# Patient Record
Sex: FEMALE | Race: AMERICAN INDIAN OR ALASKA NATIVE
[De-identification: names, ages, dates, MRNs, and addresses within clinical notes are randomized per-mention and may not be internally consistent; named-entity substitution may affect disease eponyms.]

---

## 2017-05-15 ENCOUNTER — HOSPITAL ENCOUNTER (EMERGENCY)
Dept: HOSPITAL 31 - C.ER | Age: 77
Discharge: HOME | End: 2017-05-15
Payer: MEDICARE

## 2017-05-15 VITALS
OXYGEN SATURATION: 98 % | RESPIRATION RATE: 16 BRPM | DIASTOLIC BLOOD PRESSURE: 85 MMHG | SYSTOLIC BLOOD PRESSURE: 149 MMHG

## 2017-05-15 VITALS — BODY MASS INDEX: 25 KG/M2

## 2017-05-15 VITALS — HEART RATE: 75 BPM | TEMPERATURE: 97.6 F

## 2017-05-15 DIAGNOSIS — R42: Primary | ICD-10-CM

## 2017-05-15 DIAGNOSIS — E11.22: ICD-10-CM

## 2017-05-15 DIAGNOSIS — N18.6: ICD-10-CM

## 2017-05-15 DIAGNOSIS — I12.0: ICD-10-CM

## 2017-05-15 DIAGNOSIS — Z99.2: ICD-10-CM

## 2017-05-15 LAB
ALBUMIN/GLOB SERPL: 1.2 {RATIO} (ref 1–2.1)
ALP SERPL-CCNC: 134 U/L (ref 38–126)
ALT SERPL-CCNC: 14 U/L (ref 9–52)
AST SERPL-CCNC: 33 U/L (ref 14–36)
BASOPHILS # BLD AUTO: 0.1 K/UL (ref 0–0.2)
BASOPHILS NFR BLD: 1.7 % (ref 0–2)
BILIRUB SERPL-MCNC: 1 MG/DL (ref 0.2–1.3)
BUN SERPL-MCNC: 23 MG/DL (ref 7–17)
CALCIUM SERPL-MCNC: 8.7 MG/DL (ref 8.6–10.4)
CHLORIDE SERPL-SCNC: 85 MMOL/L (ref 98–107)
CO2 SERPL-SCNC: 35 MMOL/L (ref 22–30)
EOSINOPHIL # BLD AUTO: 0.3 K/UL (ref 0–0.7)
EOSINOPHIL NFR BLD: 5.9 % (ref 0–4)
ERYTHROCYTE [DISTWIDTH] IN BLOOD BY AUTOMATED COUNT: 19.8 % (ref 11.5–14.5)
GLOBULIN SER-MCNC: 3.8 GM/DL (ref 2.2–3.9)
GLUCOSE SERPL-MCNC: 101 MG/DL (ref 65–105)
HCT VFR BLD CALC: 36.3 % (ref 34–47)
LYMPHOCYTES # BLD AUTO: 1.6 K/UL (ref 1–4.3)
LYMPHOCYTES NFR BLD AUTO: 26.7 % (ref 20–40)
MCH RBC QN AUTO: 25.1 PG (ref 27–31)
MCHC RBC AUTO-ENTMCNC: 31.9 G/DL (ref 33–37)
MCV RBC AUTO: 78.7 FL (ref 81–99)
MONOCYTES # BLD: 0.7 K/UL (ref 0–0.8)
MONOCYTES NFR BLD: 12.5 % (ref 0–10)
NRBC BLD AUTO-RTO: 0.2 % (ref 0–2)
PLATELET # BLD: 234 K/UL (ref 130–400)
PMV BLD AUTO: 8.9 FL (ref 7.2–11.7)
POTASSIUM SERPL-SCNC: 3.9 MMOL/L (ref 3.6–5.2)
PROT SERPL-MCNC: 8.3 G/DL (ref 6.3–8.3)
SODIUM SERPL-SCNC: 135 MMOL/L (ref 132–148)
TROPONIN I SERPL-MCNC: 0.06 NG/ML (ref 0–0.12)
WBC # BLD AUTO: 5.8 K/UL (ref 4.8–10.8)

## 2017-05-15 NOTE — RAD
PROCEDURE:  CHEST RADIOGRAPH, 1 VIEW



HISTORY:

SOB



COMPARISON:

10/03/2016



FINDINGS:



LUNGS:

Poor inspiration with low lung volumes, mild crowded bronchovascular 

markings and mild bibasilar atelectasis. . Previously noted mild 

venous congestive changes improved however there may be some mild 

chronic compensated pulmonary venous congestion.



PLEURA:

No pneumothorax or pleural fluid seen.



CARDIOVASCULAR:

Mild cardiomegaly.



OSSEOUS STRUCTURES:

Degenerative changes both shoulder girdles. Key



VISUALIZED UPPER ABDOMEN:

Normal.



OTHER FINDINGS:

Re- demonstrated is in situ endovascular stent within the medial 

aspect soft tissues right upper extremity with adjacent 

spur-surrounding multiple metallic vascular clips 



IMPRESSION:

Poor inspiration with low lung volumes, mild crowded bronchovascular 

markings and mild bibasilar atelectasis. . Previously noted mild 

venous congestive changes improved however there may be some mild 

chronic compensated pulmonary venous congestion.



Cardiomegaly.

## 2017-05-16 NOTE — CARD
--------------- APPROVED REPORT --------------





EKG Measurement

Heart [id number]

[place] [id number]

[id number]

[id number]

[id number]



<Conclusion>

Sinus rhythm with occasional premature ventricular complexes

Cannot rule out Anterior infarct, age undetermined

Prolonged QT

Abnormal ECG

## 2017-06-14 ENCOUNTER — HOSPITAL ENCOUNTER (OUTPATIENT)
Dept: HOSPITAL 31 - C.ER | Age: 77
Setting detail: OBSERVATION
LOS: 2 days | Discharge: HOME | End: 2017-06-16
Attending: FAMILY MEDICINE | Admitting: FAMILY MEDICINE
Payer: MEDICARE

## 2017-06-14 VITALS — BODY MASS INDEX: 25 KG/M2

## 2017-06-14 DIAGNOSIS — N18.6: ICD-10-CM

## 2017-06-14 DIAGNOSIS — I25.10: ICD-10-CM

## 2017-06-14 DIAGNOSIS — E11.22: Primary | ICD-10-CM

## 2017-06-14 DIAGNOSIS — I13.11: ICD-10-CM

## 2017-06-14 DIAGNOSIS — Z79.4: ICD-10-CM

## 2017-06-14 LAB
ALBUMIN/GLOB SERPL: 1.3 {RATIO} (ref 1–2.1)
ALP SERPL-CCNC: 130 U/L (ref 38–126)
ALT SERPL-CCNC: 21 U/L (ref 9–52)
APTT BLD: 25 SECONDS (ref 21–34)
AST SERPL-CCNC: 33 U/L (ref 14–36)
BASOPHILS # BLD AUTO: 0.1 K/UL (ref 0–0.2)
BASOPHILS NFR BLD: 1.6 % (ref 0–2)
BILIRUB SERPL-MCNC: 1 MG/DL (ref 0.2–1.3)
BUN SERPL-MCNC: 21 MG/DL (ref 7–17)
CALCIUM SERPL-MCNC: 8.1 MG/DL (ref 8.6–10.4)
CHLORIDE SERPL-SCNC: 88 MMOL/L (ref 98–107)
CO2 SERPL-SCNC: 29 MMOL/L (ref 22–30)
EOSINOPHIL # BLD AUTO: 0.2 K/UL (ref 0–0.7)
EOSINOPHIL NFR BLD: 5.8 % (ref 0–4)
ERYTHROCYTE [DISTWIDTH] IN BLOOD BY AUTOMATED COUNT: 18.3 % (ref 11.5–14.5)
GLOBULIN SER-MCNC: 3.4 GM/DL (ref 2.2–3.9)
GLUCOSE SERPL-MCNC: 92 MG/DL (ref 65–105)
HCT VFR BLD CALC: 37.9 % (ref 34–47)
INR PPP: 1
LYMPHOCYTES # BLD AUTO: 0.9 K/UL (ref 1–4.3)
LYMPHOCYTES NFR BLD AUTO: 21.8 % (ref 20–40)
MCH RBC QN AUTO: 25.9 PG (ref 27–31)
MCHC RBC AUTO-ENTMCNC: 31.5 G/DL (ref 33–37)
MCV RBC AUTO: 82.1 FL (ref 81–99)
MONOCYTES # BLD: 0.4 K/UL (ref 0–0.8)
MONOCYTES NFR BLD: 11.1 % (ref 0–10)
NRBC BLD AUTO-RTO: 0.1 % (ref 0–2)
PLATELET # BLD: 232 K/UL (ref 130–400)
PMV BLD AUTO: 8.2 FL (ref 7.2–11.7)
POTASSIUM SERPL-SCNC: 3.9 MMOL/L (ref 3.6–5.2)
PROT SERPL-MCNC: 7.7 G/DL (ref 6.3–8.3)
SODIUM SERPL-SCNC: 133 MMOL/L (ref 132–148)
TROPONIN I SERPL-MCNC: 0.05 NG/ML (ref 0–0.12)
WBC # BLD AUTO: 4 K/UL (ref 4.8–10.8)

## 2017-06-14 PROCEDURE — 84439 ASSAY OF FREE THYROXINE: CPT

## 2017-06-14 PROCEDURE — 85730 THROMBOPLASTIN TIME PARTIAL: CPT

## 2017-06-14 PROCEDURE — 85610 PROTHROMBIN TIME: CPT

## 2017-06-14 PROCEDURE — 93005 ELECTROCARDIOGRAM TRACING: CPT

## 2017-06-14 PROCEDURE — 83880 ASSAY OF NATRIURETIC PEPTIDE: CPT

## 2017-06-14 PROCEDURE — 93306 TTE W/DOPPLER COMPLETE: CPT

## 2017-06-14 PROCEDURE — 84100 ASSAY OF PHOSPHORUS: CPT

## 2017-06-14 PROCEDURE — 83036 HEMOGLOBIN GLYCOSYLATED A1C: CPT

## 2017-06-14 PROCEDURE — 80053 COMPREHEN METABOLIC PANEL: CPT

## 2017-06-14 PROCEDURE — 71010: CPT

## 2017-06-14 PROCEDURE — 82948 REAGENT STRIP/BLOOD GLUCOSE: CPT

## 2017-06-14 PROCEDURE — 82550 ASSAY OF CK (CPK): CPT

## 2017-06-14 PROCEDURE — 84484 ASSAY OF TROPONIN QUANT: CPT

## 2017-06-14 PROCEDURE — 36415 COLL VENOUS BLD VENIPUNCTURE: CPT

## 2017-06-14 PROCEDURE — 96372 THER/PROPH/DIAG INJ SC/IM: CPT

## 2017-06-14 PROCEDURE — 99285 EMERGENCY DEPT VISIT HI MDM: CPT

## 2017-06-14 PROCEDURE — 80061 LIPID PANEL: CPT

## 2017-06-14 PROCEDURE — 82553 CREATINE MB FRACTION: CPT

## 2017-06-14 PROCEDURE — 84443 ASSAY THYROID STIM HORMONE: CPT

## 2017-06-14 PROCEDURE — 85025 COMPLETE CBC W/AUTO DIFF WBC: CPT

## 2017-06-14 PROCEDURE — 83735 ASSAY OF MAGNESIUM: CPT

## 2017-06-14 RX ADMIN — INSULIN ASPART SCH: 100 INJECTION, SOLUTION INTRAVENOUS; SUBCUTANEOUS at 22:00

## 2017-06-14 RX ADMIN — INSULIN ASPART SCH UNIT: 100 INJECTION, SOLUTION INTRAVENOUS; SUBCUTANEOUS at 17:37

## 2017-06-14 NOTE — C.PDOC
History Of Present Illness


77 year old patient, with a past medical history of Anemia, Asthma, CAD, 

Diabetes, HTN, Hypothyroidism, End Stage Renal Disease (M,W,F), BIBA for 

evaluation of left sided chest pain that began prior to arrival. Patient was at 

the end of her dialysis session when the pain began (approx 30 min left). She 

was given Aspirin and SL nitro PTA.  Patient states she still has chest pain, 

but it has decreased in intensity. Patient denies shortness of breath, cough, 

fever, palpitations, nausea/vomiting.  She states she was scheduled for a 

stress test today by Dr. MICHAEL Adams.


Time Seen by Provider: 17 09:47


Chief Complaint (Nursing): Chest Pain


History Per: Patient


History/Exam Limitations: no limitations


Onset/Duration Of Symptoms: Hrs (prior to arrival)


Current Symptoms Are (Timing): Still Present


Context: Other


Severity: Mild


Quality: "Pain"


Exacerbating Factors: None


Nitro Therapy Administered: 1 (at dialysis), Partial Relief


Recent travel outside of the [place]: No


Additional History Per: EMS





Past Medical History


Reviewed: Historical Data, Nursing Documentation, Vital Signs


Vital Signs: 


 Last Vital Signs











Temp  98.2 F   17 14:40


 


Pulse  54 L  17 16:00


 


Resp  17   17 16:00


 


BP  118/82   17 16:00


 


Pulse Ox  97   17 16:00














- Medical History


PMH: Anemia, Asthma, CAD, Diabetes, HTN, Hypothyroidism, End Stage Renal 

Disease (3 x a week  m-w-f), Chronic Kidney Disease


Surgical History: Coronary Stent





- [facility] Procedures








ASPIRATION SKIN & SUBQ (14)


EXCISION OF RIGHT LOBE LIVER, PERCUTANEOUS APPROACH, DIAGN (16)


FLUOROSCOPY OF GALLBLADDER & BILE DUCT USING L OSM CONTRAST (16)


HEMODIALYSIS (03/20/15)


PERFORMANCE OF URINARY FILTRATION, SINGLE (10/03/16)


RESECTION OF GALLBLADDER, PERCUTANEOUS ENDOSCOPIC APPROACH (16)








Family History: States: No Known Family Hx





- Social History


Hx Tobacco Use: No


Hx Alcohol Use: No


Hx Substance Use: No





- Immunization History


Hx Tetanus Toxoid Vaccination: No


Hx Influenza Vaccination: No


Hx Pneumococcal Vaccination: No





Review Of Systems


Except As Marked, All Systems Reviewed And Found Negative.


Constitutional: Negative for: Fever


Cardiovascular: Positive for: Chest Pain.  Negative for: Palpitations


Respiratory: Negative for: Cough, Shortness of Breath


Gastrointestinal: Negative for: Nausea, Vomiting, Abdominal Pain, Diarrhea





Physical Exam





- Physical Exam


Appears: Well, Non-toxic, No Acute Distress


Skin: Warm, Dry


Head: Normacephalic


Eye(s): bilateral: Normal Inspection


Oral Mucosa: Moist


Neck: Normal ROM, Supple


Cardiovascular: Rhythm Regular


Respiratory: Normal Breath Sounds, No Rales, No Rhonchi, No Wheezing


Gastrointestinal/Abdominal: Normal Exam, Bowel Sounds, Soft, No Tenderness


Extremity: Normal ROM, No Tenderness, No Pedal Edema, No Calf Tenderness, No 

Swelling


Neurological/Psych: Oriented x3, Other (speaking in full sentences)





ED Course And Treatment





- Laboratory Results


Result Diagrams: 


 17 08:27





 17 08:27


ECG: Interpreted By Me, Viewed By Me (sinus tachycardia 119 bpm, left axis 

deviation, PVCs, nonspecific ST elevation V2 - unchanged from prior EKG 5/15/17)


ECG Rhythm: Sinus Tachycardia, PVC


ECG Interpretation: No Changes From Prior, Abnormal


Rate From EC (bpm)


O2 Sat by Pulse Oximetry: 100 (room air)


Pulse Ox Interpretation: Normal





- Other Rad


  ** chest x-ray


X-Ray: Read By Radiologist (Reed Gorman MD)


Interpretation: PROCEDURE:  CHEST RADIOGRAPH, 1 VIEW.  HISTORY:  cp.  COMPARISON

:  Comparison chest 05/15/2017. Comparison chest.  FINDINGS:  LUNGS:  The right 

lung apex partially obscured by overlying facial soft tissue and mandible 

artifact. Lung fields are otherwise clear.  PLEURA:  No pneumothorax or pleural 

fluid seen.  CARDIOVASCULAR:  Heart size is upper limits of normal/ borderline 

enlarged.  Aorta is slightly ectatic and uncoiled.  OSSEOUS STRUCTURES:  No 

significant abnormalities.  VISUALIZED UPPER ABDOMEN:  Normal.  OTHER FINDINGS:

  Re- demonstrated is in situ vascular stent medial soft tissues proximal right 

upper extremity.  IMPRESSION:  Slightly limited study demonstrating no acute 

cardiopulmonary disease.


Progress Note: Blood work, EKG, CXR ordered and reviewed.  Patient already 

given SL nitro and ASA prior to ED arrival.  Discussed patient with Dr. Adams

, patient to be admitted to hospitalist service for chest pain, r/o ACS.





- Physician Consult Information


Physician Contacted: Jennifer Navarro


Outcome Of Conversation: Hospitalist accepts patient for obs tele - chest pain, 

r/o ACS.





Disposition





- Disposition


Disposition: HOSPITALIZED


Disposition Time: 13:46


Condition: STABLE





- Clinical Impression


Clinical Impression: 


 ESRD (end stage renal disease), Chest pain








- Scribe Statement


The provider has reviewed the documentation as recorded by the Scribe


Nupur Levin


Provider Attestation: 


All medical record entries made by the Scribe were at my direction and 

personally dictated by me. I have reviewed the chart and agree that the record 

accurately reflects my personal performance of the history, physical exam, 

medical decision making, and the department course for this patient. I have 

also personally directed, reviewed, and agree with the discharge instructions 

and disposition.





Decision To Admit





- Pt Status Changed To:


Hospital Disposition Of: Observation





- .


Bed Request Type: Telemetry


Admitting Physician: Jennifer Navarro


Patient Diagnosis: 


 Chest pain, ESRD (end stage renal disease)

## 2017-06-14 NOTE — RAD
PROCEDURE:  CHEST RADIOGRAPH, 1 VIEW



HISTORY:

cp



COMPARISON:

Comparison chest 05/15/2017. Comparison chest 



FINDINGS:



LUNGS:

The right lung apex partially obscured by overlying facial soft 

tissue and mandible artifact. Lung fields are otherwise clear 



PLEURA:

No pneumothorax or pleural fluid seen.



CARDIOVASCULAR:

Heart size is upper limits of normal/ borderline enlarged.  Aorta is 

slightly ectatic and uncoiled.



OSSEOUS STRUCTURES:

No significant abnormalities.



VISUALIZED UPPER ABDOMEN:

Normal.



OTHER FINDINGS:

Re- demonstrated is in situ vascular stent medial soft tissues 

proximal right upper extremity 



IMPRESSION:

Slightly limited study demonstrating no acute cardiopulmonary 

disease.

## 2017-06-14 NOTE — CP.PCM.CON
History of Present Illness





- History of Present Illness


History of Present Illness: 








CC: chest pain


HPI: 77 year old female with extensive PMHx significant for coronary stent 

placement, HTN, DM, ESRD on HD and CVA presents with chief complaint of chest 

pain earlier today while at dialysis. Patient states that she was at 

hemodialysis center when she passed out on the bed. Patient states that she was 

experiencing some chest pain at the time and was administered 4 baby aspirins .

  The pain was described as a sharp 9/10 pain without radiation.  She denied 

associations with ambulation, respirations or body positions. EMS was called 

and patient was brought for evaluation. Patient states that she has never 

experienced such an occurrence. She states that her symptoms have now resolved.

  Patient now denies chest pain, palpitaitons, shortness of breath, vision 

changes, headaches, abdominal pain, nausea paresthesias at this time. 





PMH: as stated above


PSH: AV fistula, AV graft, coronary stent, cholecystectomy, liver cyst drainage


Home meds: Amlodipine 10 mg daily, ASA 81 mg daily, Lipitor 20 mg daily, Coreg 

3.125 mg daily, Sensipar 30 mg daily, Catapres 0.3 mg PO BID, Nexium 40 mg daily

, Cozaar 50 mg PO daily, Trileptal 150 mg PO daily, Calcium acetate 667 mg Po 

daily, Crestor 10 mg Po daily, Crestor 0 mg daily, Megestrol Acetate 40 mg daily


FMH: father had DM and ESRD


Social: denied ETOH, ever smoking or drug use


Allergies: NKDA





Dialysis done today.


For cardiac evaluation.


Will reschedule dialysis as needed.








Consult dictated





Past Patient History





- Infectious Disease


Hx of Infectious Diseases: None





- Tetanus Immunizations


Tetanus Immunization: Unknown





- Past Medical History & Family History


Past Medical History?: Yes





- Past Social History


Smoking Status: Never Smoked





- CARDIAC


Hx Hypertension: Yes





- PULMONARY


Hx Asthma: Yes





- NEUROLOGICAL


Hx Neurological Disorder: No





- HEENT


Hx HEENT Problems: No





- RENAL


Hx Chronic Kidney Disease: Yes





- ENDOCRINE/METABOLIC


Hx Hypothyroidism: Yes





- HEMATOLOGICAL/ONCOLOGICAL


Hx Anemia: Yes





- INTEGUMENTARY


Hx Dermatological Problems: No





- MUSCULOSKELETAL/RHEUMATOLOGICAL


Hx Musculoskeletal Disorders: No


Hx Falls: No





- GASTROINTESTINAL


Hx Gastrointestinal Disorders: No





- GENITOURINARY/GYNECOLOGICAL


Hx Genitourinary Disorders: No





- PSYCHIATRIC


Hx Substance Use: No





- SURGICAL HISTORY


Hx Coronary Stent: Yes





- ANESTHESIA


Hx Anesthesia: Yes


Hx Anesthesia Reactions: No


Hx Malignant Hyperthermia: No





Meds


Allergies/Adverse Reactions: 


 Allergies











Allergy/AdvReac Type Severity Reaction Status Date / Time


 


No Known Allergies Allergy   Verified 05/15/17 10:20














- Medications


Medications: 


 Current Medications





Amlodipine Besylate (Norvasc)  10 mg PO DAILY [facility]


Aspirin (Ecotrin)  81 mg PO DAILY [facility]


Carvedilol (Coreg)  3.125 mg PO DAILY [facility]


Cinacalcet (Sensipar)  30 mg PO DAILY [facility]


Clonidine HCl (Catapres)  0.3 mg PO BID [facility]


Heparin Sodium (Porcine) (Heparin)  5,000 units SC Q12 [facility]


Home Med (Atorvastatin [Lipitor])  20 mg PO DAILY [facility]


Home Med (Calcium Acetate [Phoslo])  667 mg PO DAILY [facility]


Home Med (Esomeprazole Magnesium [Nexium])  40 mg PO DAILY [facility]


Insulin Aspart (Novolog)  0 unit SC ACHS [facility]


   PRN Reason: Protocol


Oxcarbazepine (Trileptal)  150 mg PO DAILY [facility]











Results





- Vital Signs


Recent Vital Signs: 


 Last Vital Signs











Temp  98.4 F   06/14/17 09:49


 


Pulse  73   06/14/17 13:25


 


Resp  18   06/14/17 13:25


 


BP  159/69 H  06/14/17 13:25


 


Pulse Ox  100   06/14/17 13:50














- Labs


Result Diagrams: 


 06/14/17 10:48





 06/14/17 10:48

## 2017-06-14 NOTE — CP.PCM.HP
<Stacie Blackwood - Last Filed: 06/14/17 16:08>





History of Present Illness





- History of Present Illness


History of Present Illness: 





CC: chest pain


HPI: 77 year old female with extensive PMHx significant for coronary stent 

placement, HTN, DM, ESRD on HD and CVA presents with chief complaint of chest 

pain earlier today while at dialysis. Patient states that she was at 

hemodialysis center when she passed out on the bed. Patient states that she was 

experiencing some chest pain at the time and was administered 4 baby aspirins .

  The pain was described as a sharp 9/10 pain without radiation.  She denied 

associations with ambulation, respirations or body positions. EMS was called 

and patient was brought for evaluation. Patient states that she has never 

experienced such an occurrence. She states that her symptoms have now resolved.

  Patient now denies chest pain, palpitaitons, shortness of breath, vision 

changes, headaches, abdominal pain, nausea paresthesias at this time. 





PMH: as stated above


PSH: AV fistula, AV graft, coronary stent, cholecystectomy, liver cyst drainage


Home meds: Amlodipine 10 mg daily, ASA 81 mg daily, Lipitor 20 mg daily, Coreg 

3.125 mg daily, Sensipar 30 mg daily, Catapres 0.3 mg PO BID, Nexium 40 mg daily

, Cozaar 50 mg PO daily, Trileptal 150 mg PO daily, Calcium acetate 667 mg Po 

daily, Crestor 10 mg Po daily, Crestor 0 mg daily, Megestrol Acetate 40 mg daily


FMH: father had DM and ESRD


Social: denied ETOH, ever smoking or drug use


Allergies: NKDA


PMD: Dr. Tatiana Mars





Present on Admission





- Present on Admission


Any Indicators Present on Admission: No





Review of Systems





- Cardiovascular


Cardiovascular: Chest Pain, Syncope.  absent: Palpitations





- Respiratory


Respiratory: absent: Cough





- Gastrointestinal


Gastrointestinal: Vomiting.  absent: Abdominal Pain





- Genitourinary


Genitourinary: absent: Change in Urinary Stream





- Musculoskeletal


Musculoskeletal: absent: Muscle Weakness, Numbness





- Integumentary


Integumentary: Dry Skin





- Neurological


Neurological: absent: Confusion, Numbness





- Psychiatric


Psychiatric: absent: Anxiety





Past Patient History





- Infectious Disease


Hx of Infectious Diseases: None





- Tetanus Immunizations


Tetanus Immunization: Unknown





- Past Medical History & Family History


Past Medical History?: Yes





- Past Social History


Smoking Status: Never Smoked


Alcohol: None


Drugs: Denies


Home Situation {Lives}: Alone





- CARDIAC


Hx Hypertension: Yes





- PULMONARY


Hx Asthma: Yes





- NEUROLOGICAL


Hx Neurological Disorder: No





- HEENT


Hx HEENT Problems: No





- RENAL


Hx Chronic Kidney Disease: Yes





- ENDOCRINE/METABOLIC


Hx Hypothyroidism: Yes





- HEMATOLOGICAL/ONCOLOGICAL


Hx Anemia: Yes





- INTEGUMENTARY


Hx Dermatological Problems: No





- MUSCULOSKELETAL/RHEUMATOLOGICAL


Hx Musculoskeletal Disorders: No


Hx Falls: No





- GASTROINTESTINAL


Hx Gastrointestinal Disorders: No





- GENITOURINARY/GYNECOLOGICAL


Hx Genitourinary Disorders: No





- PSYCHIATRIC


Hx Substance Use: No





- SURGICAL HISTORY


Hx Coronary Stent: Yes





- ANESTHESIA


Hx Anesthesia: Yes


Hx Anesthesia Reactions: No


Hx Malignant Hyperthermia: No





Meds


Allergies/Adverse Reactions: 


 Allergies











Allergy/AdvReac Type Severity Reaction Status Date / Time


 


No Known Allergies Allergy   Verified 05/15/17 10:20














Physical Exam





- Constitutional


Appears: Non-toxic





- Head Exam


Head Exam: ATRAUMATIC, NORMAL INSPECTION, NORMOCEPHALIC





- Eye Exam


Eye Exam: EOMI, Normal appearance, PERRL


Pupil Exam: NORMAL ACCOMODATION





- ENT Exam


ENT Exam: Mucous Membranes Moist, Normal Exam





- Neck Exam


Neck exam: Positive for: Full Rom





- Respiratory Exam


Respiratory Exam: NORMAL BREATHING PATTERN.  absent: Wheezes





- Cardiovascular Exam


Cardiovascular Exam: REGULAR RHYTHM, +S1, +S2





- GI/Abdominal Exam


GI & Abdominal Exam: Normal Bowel Sounds, Soft





- Extremities Exam


Extremities exam: Positive for: full ROM, pedal edema (trace), pedal pulses 

present





- Back Exam


Back exam: FULL ROM





- Neurological Exam


Neurological exam: Alert, CN II-XII Intact, Oriented x3





- Psychiatric Exam


Psychiatric exam: Normal Affect, Normal Mood





- Skin


Skin Exam: Dry, Intact, Normal Color, Warm





Results





- Vital Signs


Recent Vital Signs: 





 Last Vital Signs











Temp  98.4 F   06/14/17 09:49


 


Pulse  73   06/14/17 13:25


 


Resp  18   06/14/17 13:25


 


BP  159/69 H  06/14/17 13:25


 


Pulse Ox  100   06/14/17 13:50














- Labs


Result Diagrams: 


 06/14/17 10:48





 06/14/17 10:48





Assessment & Plan


(1) Chest pain, rule out acute myocardial infarction


Assessment and Plan: 


EKG:Sinus tachy with premature supraventricular complexes noted


Troponin 1 negative


Hx of coronary stent placement years ago (Pt could not quantify when)


ASA 81 mg, Crestor 10 mg


F/U AASHISH 2 and 3 with EKGS


F/U Echo, Lipid Panel, Hgb A1c, Thyroid studies








Status: Acute   





(2) ESRD on hemodialysis


Assessment and Plan: 


ESRD on HD M,W,F 


Had HD on 6/14


Calcium Acetate 667 mg PO TID


Consult to Nephrology  Dr. Garcia- F/U


Status: Chronic   





(3) DM type 2 (diabetes mellitus, type 2)


Assessment and Plan: 


Accuchecks


ISS


F/U Hgb A1c


ESRD on HD M,W,F 


Had HD on 6/14


Status: Chronic   





(4) Hypertension


Assessment and Plan: 


Continue Home Meds at this time: Amlodipine, Coreg, Cozaar, Catapres


Hold parameters for SBP under 110


Continue to monitor BP


Status: Chronic   





(5) Prophylactic measure


Assessment and Plan: 


Heparin SC Q12


PPI 40 mg daily


SCDs








Status: Acute   





<Jennifer Navarro V - Last Filed: 06/16/17 10:53>





Results





- Vital Signs


Recent Vital Signs: 





 Last Vital Signs











Temp  98.1 F   06/16/17 07:10


 


Pulse  59 L  06/16/17 07:10


 


Resp  20   06/16/17 07:10


 


BP  155/69 H  06/16/17 07:10


 


Pulse Ox  97   06/16/17 07:10














- Labs


Result Diagrams: 


 06/16/17 08:27





 06/16/17 08:27


Labs: 





 Laboratory Results - last 24 hr











  06/15/17 06/15/17 06/15/17





  07:37 11:31 16:02


 


WBC   


 


RBC   


 


Hgb   


 


Hct   


 


MCV   


 


MCH   


 


MCHC   


 


RDW   


 


Plt Count   


 


MPV   


 


Neut % (Auto)   


 


Lymph % (Auto)   


 


Mono % (Auto)   


 


Eos % (Auto)   


 


Baso % (Auto)   


 


Neut #   


 


Lymph #   


 


Mono #   


 


Eos #   


 


Baso #   


 


Sodium   


 


Potassium   


 


Chloride   


 


Carbon Dioxide   


 


Anion Gap   


 


BUN   


 


Creatinine  7.4 H* D  


 


Est GFR ( Amer)   


 


Est GFR (Non-Af Amer)   


 


POC Glucose (mg/dL)   152 H  94


 


Random Glucose   


 


Calcium   


 


Phosphorus   


 


Magnesium   


 


Total Bilirubin   


 


AST   


 


ALT   


 


Alkaline Phosphatase   


 


Total Protein   


 


Albumin   


 


Globulin   


 


Albumin/Globulin Ratio   














  06/15/17 06/15/17 06/16/17





  17:59 21:40 06:44


 


WBC   


 


RBC   


 


Hgb   


 


Hct   


 


MCV   


 


MCH   


 


MCHC   


 


RDW   


 


Plt Count   


 


MPV   


 


Neut % (Auto)   


 


Lymph % (Auto)   


 


Mono % (Auto)   


 


Eos % (Auto)   


 


Baso % (Auto)   


 


Neut #   


 


Lymph #   


 


Mono #   


 


Eos #   


 


Baso #   


 


Sodium   


 


Potassium   


 


Chloride   


 


Carbon Dioxide   


 


Anion Gap   


 


BUN   


 


Creatinine   


 


Est GFR ( Amer)   


 


Est GFR (Non-Af Amer)   


 


POC Glucose (mg/dL)  206 H  218 H  165 H


 


Random Glucose   


 


Calcium   


 


Phosphorus   


 


Magnesium   


 


Total Bilirubin   


 


AST   


 


ALT   


 


Alkaline Phosphatase   


 


Total Protein   


 


Albumin   


 


Globulin   


 


Albumin/Globulin Ratio   














  06/16/17 06/16/17





  08:27 08:27


 


WBC  4.1 L 


 


RBC  4.14 


 


Hgb  11.0 


 


Hct  34.4 


 


MCV  82.9 


 


MCH  26.6 L 


 


MCHC  32.1 L 


 


RDW  18.0 H 


 


Plt Count  200 


 


MPV  8.5 


 


Neut % (Auto)  40.9 L 


 


Lymph % (Auto)  33.2 


 


Mono % (Auto)  14.6 H 


 


Eos % (Auto)  9.1 H 


 


Baso % (Auto)  2.2 H 


 


Neut #  1.7 L 


 


Lymph #  1.4 


 


Mono #  0.6 


 


Eos #  0.4 


 


Baso #  0.1 


 


Sodium   134


 


Potassium   4.9


 


Chloride   95 L


 


Carbon Dioxide   29


 


Anion Gap   15


 


BUN   29 H


 


Creatinine   5.8 H


 


Est GFR ( Amer)   9


 


Est GFR (Non-Af Amer)   7


 


POC Glucose (mg/dL)  


 


Random Glucose   131 H


 


Calcium   8.2 L


 


Phosphorus   4.8 H


 


Magnesium   2.2


 


Total Bilirubin   0.6


 


AST   35


 


ALT   12


 


Alkaline Phosphatase   112


 


Total Protein   6.8


 


Albumin   3.5  D


 


Globulin   3.3


 


Albumin/Globulin Ratio   1.1














Attending/Attestation





- Attestation


I have personally seen and examined this patient.: Yes


I have fully participated in the care of the patient.: Yes


I have reviewed all pertinent clinical information: Yes


Notes (Text): 


This is late computer entry for 6/14/17.


Patient seen, examined and case discussed with day-time resident.


Patient seen at bedside, reporting she had chest pain during her dialysis 

session, which prompted the dialysis to send her to the emergency room for 

evaluation. Patient's PMD/Cardiologist informed by the emergency room; and was 

actually scheduled for outpatient stress test the day after but came into the 

emergency room. Patient has history of CAD with stents, Diabetes, and 

Hypertension. 





Discussed admitting orders with admitting resident. Observe patient on 

telemetry. Consult patient's nephrologist for dialysis orders. Will follow-up 

with patient's cardiologist/PMD if further cardiac is needed.





Assessment & Plan


(1) Chest pain


Assessment and Plan: 


* EKG:Sinus tachy with premature supraventricular complexes noted


* Troponin 1 negative


* Hx of coronary stent placement years ago (Pt could not quantify when)


* ASA 81 mg PO daily


* Crestor 10 mg PO qHS


* F/U AASHISH 2 and 3 with EKGS


* F/U Echo, Lipid Panel, Hgb A1c, Thyroid studies


Status: Acute   





(2) CAD with stent 


Assessment and Plan: 


* ASA 81 mg PO daily


* Crestor 10 mg PO qHS


* F/U AASHISH 2 and 3 with EKGS


* F/U Echo, Lipid Panel, Hgb A1c, Thyroid studies


* Amlodipine 10mg PO qdaily 


* Coreg 6.25mg PO bid


* Clonidine 0.3mg PO bid


Status: Acute   





(3) ESRD on hemodialysis


Assessment and Plan: 


* ESRD on HD M,W,F as outpatient


* Had HD on 6/14


* Calcium Acetate 667 mg PO TID


* Consult to Nephrology  Dr. Garcia- patient's nephrologist


Status: Chronic   





(4) DM type 2 (diabetes mellitus, type 2)


Assessment and Plan: 


* Accuchecks QAC and HS


* ISS


* F/U Hgb A1c


Status: Chronic   





(5) Hypertension


Assessment and Plan: 


* Amlodipine 10mg PO qdaily 


* Coreg 6.25mg PO bid


* Clonidine 0.3mg PO bid


* ESRD on MWF


* Completed dialysis on admission


Status: Chronic   





(6) Prophylactic measure


Assessment and Plan: 


* Heparin 5000 unit SC Q12


* Protonix 40 mg daily


* SCDS b/l


Status: Acute

## 2017-06-14 NOTE — CON
DATE: 06/14/2017



HISTORY OF PRESENT ILLNESS:  The patient is a 77-year-old female with past medical history of end-sta
ge renal disease and known coronary artery disease.  She presented with initially chest pain at dialy
[facility], received nitroglycerin at dialysis.  She had a syncopal episode.  It is unclear if this preceded
 the chest pain or afterwards.  She was brought into the Emergency Department by EMS from the dialysi
s unit.  She received almost a full dialysis today.



PAST MEDICAL HISTORY:  End-stage renal disease, coronary artery disease for which she is status post 
coronary stent placement, diabetes mellitus type 2, hypertension, diabetic nephropathy and is status 
post cerebrovascular accident.



PAST SURGICAL HISTORY:  Cholecystectomy, the coronary stent, AV graft, a previous AV fistula which fa
iled, and liver cyst drainage.



MEDICATIONS:  Include amlodipine, aspirin, Lipitor, Coreg, Sensipar, Catapres, Nexium, Cozaar, Trilep
michael, calcium acetate, Crestor, Megace recently for poor nutrition.



FAMILY HISTORY:  Has several children with chronic kidney disease.



REVIEW OF SYSTEMS:  Significant for chest pain at times with increased activity, but not recently.  S
he has dyspnea on exertion at 2-3 blocks.  No new rashes, no visual disturbances, hearing deficits.  
No nausea, vomiting, diarrhea.  She has little urine output.  Other review of systems are all negativ
e.



PHYSICAL EXAMINATION:

VITAL SIGNS:  Blood pressure 159/69, pulse 73, temperature 98.4, pulse ox was 100%.

HEENT:  Anicteric.  Mouth was clear.

NECK:  No JVD.

LUNGS:  Lung fields were clear.

HEART:  Regular rhythm, no murmur.

ABDOMEN:  Soft, benign, no mass or organomegaly.

EXTREMITIES:  She had an upper extremity AV graft with a thrill and bruit.  No peripheral edema.

NEUROLOGIC:  No focal deficits.



BLOOD WORK:  Showed hemoglobin 11.9, potassium of 3.9, creatinine of 4.8 (that is post-dialysis), sathish
cium 8.1.  ProBNP is 30,700.  The one troponin was negative.



DIAGNOSTIC DATA:  She had a chest x-ray.  It was a limited study, but no congestive heart failure see
n or infiltrate.



IMPRESSION:  The patient has had a syncopal episode and chest pain earlier.  She has end-stage renal 
disease, history of coronary artery disease, hypertension, diabetes mellitus and a prior history of c
erebrovascular accident.



PLAN:  Will be cardiac workup, possibly a stress test and/or cardiac catheterization and likely echoc
ardiogram as well.  We will arrange for dialysis on Monday, Wednesday and Friday.  We will follow up.






__________________________________________

Guevara Garcia MD







cc:



DD: 06/14/2017 15:56:07  [id number]

TT: 06/14/2017 18:41:45

Confirmation # [id number]

Dictation # [id number]

[email or web address]

## 2017-06-15 LAB
ALBUMIN/GLOB SERPL: 1.2 {RATIO} (ref 1–2.1)
ALP SERPL-CCNC: 131 U/L (ref 38–126)
ALT SERPL-CCNC: 16 U/L (ref 9–52)
APTT BLD: 29 SECONDS (ref 21–34)
AST SERPL-CCNC: 29 U/L (ref 14–36)
BASOPHILS # BLD AUTO: 0.1 K/UL (ref 0–0.2)
BASOPHILS NFR BLD: 2 % (ref 0–2)
BILIRUB SERPL-MCNC: 0.8 MG/DL (ref 0.2–1.3)
BUN SERPL-MCNC: 37 MG/DL (ref 7–17)
CALCIUM SERPL-MCNC: 8.4 MG/DL (ref 8.6–10.4)
CHLORIDE SERPL-SCNC: 88 MMOL/L (ref 98–107)
CHOLEST SERPL-MCNC: 148 MG/DL (ref 0–199)
CO2 SERPL-SCNC: 30 MMOL/L (ref 22–30)
EOSINOPHIL # BLD AUTO: 0.3 K/UL (ref 0–0.7)
EOSINOPHIL NFR BLD: 6.4 % (ref 0–4)
ERYTHROCYTE [DISTWIDTH] IN BLOOD BY AUTOMATED COUNT: 18.5 % (ref 11.5–14.5)
GLOBULIN SER-MCNC: 3.5 GM/DL (ref 2.2–3.9)
GLUCOSE SERPL-MCNC: 111 MG/DL (ref 65–105)
HCT VFR BLD CALC: 39 % (ref 34–47)
INR PPP: 1
LYMPHOCYTES # BLD AUTO: 1.7 K/UL (ref 1–4.3)
LYMPHOCYTES NFR BLD AUTO: 32.5 % (ref 20–40)
MAGNESIUM SERPL-MCNC: 2.2 MG/DL (ref 1.6–2.3)
MCH RBC QN AUTO: 26.5 PG (ref 27–31)
MCHC RBC AUTO-ENTMCNC: 32.4 G/DL (ref 33–37)
MCV RBC AUTO: 81.8 FL (ref 81–99)
MONOCYTES # BLD: 0.6 K/UL (ref 0–0.8)
MONOCYTES NFR BLD: 11 % (ref 0–10)
NRBC BLD AUTO-RTO: 0.1 % (ref 0–2)
PHOSPHATE SERPL-MCNC: 5.6 MG/DL (ref 2.5–4.5)
PLATELET # BLD: 241 K/UL (ref 130–400)
PMV BLD AUTO: 7.7 FL (ref 7.2–11.7)
POTASSIUM SERPL-SCNC: 5.5 MMOL/L (ref 3.6–5.2)
PROT SERPL-MCNC: 7.9 G/DL (ref 6.3–8.3)
SODIUM SERPL-SCNC: 134 MMOL/L (ref 132–148)
TSH SERPL-ACNC: 0.89 MIU/L (ref 0.46–4.68)
WBC # BLD AUTO: 5.4 K/UL (ref 4.8–10.8)

## 2017-06-15 RX ADMIN — INSULIN ASPART SCH UNIT: 100 INJECTION, SOLUTION INTRAVENOUS; SUBCUTANEOUS at 13:13

## 2017-06-15 RX ADMIN — PANTOPRAZOLE SODIUM SCH MG: 40 TABLET, DELAYED RELEASE ORAL at 09:28

## 2017-06-15 RX ADMIN — INSULIN ASPART SCH: 100 INJECTION, SOLUTION INTRAVENOUS; SUBCUTANEOUS at 09:29

## 2017-06-15 RX ADMIN — INSULIN ASPART SCH: 100 INJECTION, SOLUTION INTRAVENOUS; SUBCUTANEOUS at 18:00

## 2017-06-15 NOTE — CP.PCM.PN
Subjective





- Date & Time of Evaluation


Date of Evaluation: 06/15/17


Time of Evaluation: 09:54





- Subjective


Subjective: 





Feels better; no more CPs, no dyspnea.


No new complaint


For echo now


K=5.5 despite HD on 6/14





Objective





- Vital Signs/Intake and Output


Vital Signs (last 24 hours): 


 











Temp Pulse Resp BP Pulse Ox


 


 98.3 F   69   20   186/82 H  96 


 


 06/15/17 07:00  06/15/17 07:00  06/15/17 07:00  06/15/17 07:00  06/15/17 07:00








Intake and Output: 


 











 06/15/17 06/15/17





 06:59 18:59


 


Intake Total 250 


 


Balance 250 














- Medications


Medications: 


 Current Medications





Amlodipine Besylate (Norvasc)  10 mg PO DAILY [facility]


Aspirin (Ecotrin)  81 mg PO DAILY [facility]


   Last Admin: 06/15/17 09:28 Dose:  81 mg


Calcium Acetate (Phoslo)  667 mg PO TID [facility]


   Last Admin: 06/15/17 09:28 Dose:  667 mg


Carvedilol (Coreg)  3.125 mg PO DAILY [facility]


   Last Admin: 06/15/17 09:28 Dose:  3.125 mg


Cinacalcet (Sensipar)  30 mg PO DAILY [facility]


   Last Admin: 06/15/17 09:28 Dose:  30 mg


Clonidine HCl (Catapres)  0.3 mg PO BID [facility]


   Last Admin: 06/15/17 09:28 Dose:  0.3 mg


Heparin Sodium (Porcine) (Heparin)  5,000 units SC Q12 [facility]


   Last Admin: 06/15/17 09:30 Dose:  5,000 units


Hydralazine HCl (Apresoline)  10 mg IVP Q6H PRN


   PRN Reason: Systolic Blood Pressure


Insulin Aspart (Novolog)  0 unit SC ACHS [facility]


   PRN Reason: Protocol


   Last Admin: 06/15/17 09:29 Dose:  Not Given


Oxcarbazepine (Trileptal)  150 mg PO DAILY [facility]


   Last Admin: 06/15/17 09:28 Dose:  150 mg


Pantoprazole Sodium (Protonix Ec Tab)  40 mg PO DAILY [facility]


   Last Admin: 06/15/17 09:28 Dose:  40 mg


Rosuvastatin Calcium (Crestor)  10 mg PO HS [facility]


   Last Admin: 06/14/17 21:55 Dose:  Not Given











- Labs


Labs: 


 





 06/15/17 07:37 





 06/15/17 07:37 





 











PT  11.0 SECONDS (9.7-12.2)   06/15/17  07:37    


 


INR  1.0   06/15/17  07:37    


 


APTT  29 SECONDS (21-34)   06/15/17  07:37    














- Constitutional


Appears: No Acute Distress, Chronically Ill





- Head Exam


Head Exam: ATRAUMATIC, NORMAL INSPECTION





- Eye Exam


Eye Exam: EOMI, Normal appearance





- Neck Exam


Neck Exam: Full ROM.  absent: Tenderness





- Respiratory Exam


Respiratory Exam: Clear to Ausculation Bilateral, NORMAL BREATHING PATTERN





- Cardiovascular Exam


Cardiovascular Exam: REGULAR RHYTHM, +S1





- GI/Abdominal Exam


GI & Abdominal Exam: Soft.  absent: Tenderness





- Extremities Exam


Extremities Exam: Normal Inspection.  absent: Tenderness





- Neurological Exam


Neurological Exam: Alert, CN II-XII Intact





- Skin


Skin Exam: Dry, Warm





Assessment and Plan





- Assessment and Plan (Free Text)


Assessment: 





ESRD


DM 2


HTN


CAD


New onset CPs


Plan: 





Dialysis today for elevated K


Cardio workup

## 2017-06-15 NOTE — CP.PCM.PN
<JaisonYogijeancarlos - Last Filed: 06/15/17 11:53>





Subjective





- Date & Time of Evaluation


Date of Evaluation: 06/15/17


Time of Evaluation: 07:47





- Subjective


Subjective: 





PGY 1 Medicine Note- Dr. Navarro's service





Pt seen and examined in no acute distress. Patient states that she is aware 

that her blood pressure is elevated. She just received a phone call  regarding 

an appointment that she was to have today. Patient states that she forgot to 

reschedule yesterday upon arrival here at the hospital. In addition, she just 

spoke with daughter on the phone who was asking many questions regarding patient

's current stay at the hospital. Patient stated, " I'm dealing with several 

issues at the moment. You know how family can be". Patient denies subjective 

fevers or chills, nausea, vomiting, diarrhea or constipation at this time.





Objective





- Vital Signs/Intake and Output


Vital Signs (last 24 hours): 


 











Temp Pulse Resp BP Pulse Ox


 


 98.3 F   69   20   186/82 H  96 


 


 06/15/17 07:00  06/15/17 07:00  06/15/17 07:00  06/15/17 07:00  06/15/17 07:00








Intake and Output: 


 











 06/15/17 06/15/17





 06:59 18:59


 


Intake Total 250 


 


Balance 250 














- Medications


Medications: 


 Current Medications





Amlodipine Besylate (Norvasc)  10 mg PO DAILY [facility]


Aspirin (Ecotrin)  81 mg PO DAILY [facility]


Calcium Acetate (Phoslo)  667 mg PO TID [facility]


   Last Admin: 17 20:24 Dose:  667 mg


Carvedilol (Coreg)  3.125 mg PO DAILY [facility]


Cinacalcet (Sensipar)  30 mg PO DAILY [facility]


Clonidine HCl (Catapres)  0.3 mg PO BID [facility]


   Last Admin: 17 20:24 Dose:  0.3 mg


Heparin Sodium (Porcine) (Heparin)  5,000 units SC Q12 [facility]


   Last Admin: 17 21:56 Dose:  5,000 units


Hydralazine HCl (Apresoline)  10 mg IVP Q6H PRN


   PRN Reason: Systolic Blood Pressure


Insulin Aspart (Novolog)  0 unit SC ACHS [facility]


   PRN Reason: Protocol


   Last Admin: 17 22:00 Dose:  Not Given


Oxcarbazepine (Trileptal)  150 mg PO DAILY [facility]


Pantoprazole Sodium (Protonix Ec Tab)  40 mg PO DAILY [facility]


Rosuvastatin Calcium (Crestor)  10 mg PO HS [facility]


   Last Admin: 17 21:55 Dose:  Not Given











- Labs


Labs: 


 





 06/15/17 07:37 





 06/15/17 07:37 





 











PT  11.0 SECONDS (9.7-12.2)   06/15/17  07:37    


 


INR  1.0   06/15/17  07:37    


 


APTT  29 SECONDS (21-34)   06/15/17  07:37    














- Constitutional


Appears: Non-toxic, No Acute Distress





- Head Exam


Head Exam: ATRAUMATIC, NORMAL INSPECTION, NORMOCEPHALIC





- Eye Exam


Eye Exam: EOMI, Normal appearance, PERRL


Pupil Exam: NORMAL ACCOMODATION





- ENT Exam


ENT Exam: Mucous Membranes Moist





- Neck Exam


Neck Exam: Full ROM





- Respiratory Exam


Respiratory Exam: NORMAL BREATHING PATTERN.  absent: Wheezes





- Cardiovascular Exam


Cardiovascular Exam: +S1, +S2





- GI/Abdominal Exam


GI & Abdominal Exam: Soft, Normal Bowel Sounds





- Extremities Exam


Extremities Exam: Full ROM, Normal Capillary Refill





- Back Exam


Back Exam: Full ROM





- Neurological Exam


Neurological Exam: Alert, Awake, CN II-XII Intact, Oriented x3





- Psychiatric Exam


Psychiatric exam: Normal Affect, Normal Mood





- Skin


Skin Exam: Dry, Intact, Normal Color, Warm





Assessment and Plan


(1) Chest pain, rule out acute myocardial infarction


Status: Acute   





(2) ESRD on hemodialysis


Status: Chronic   





(3) DM type 2 (diabetes mellitus, type 2)


Status: Chronic   





(4) Hypertension


Status: Chronic   





(5) Prophylactic measure


Status: Acute   





- Assessment and Plan (Free Text)


Assessment: 





(1) Chest pain, rule out acute myocardial infarction


Assessment and Plan: 


EKG:Sinus tachy with premature supraventricular complexes noted


Hx of coronary stent placement years ago (Pt could not quantify when)


ASA 81 mg, Crestor 10 mg


ROMIs negative x3 EKGS


F/U Echo official reading.


Lipid Panel WNL, 


F/U Hgb A1c, 


Thyroid studies WNL


Status: Acute   





(2) ESRD on hemodialysis


Assessment and Plan: 


ESRD typically on HD M,W,F . Will need dialysis today due to elevated 

electrolytes.


Had HD on  though likely not complete due to syncopal event.


Calcium Acetate 667 mg PO TID


Consult to Nephrology  Dr. Garcia- F/U


Status: Chronic   





(3) DM type 2 (diabetes mellitus, type 2)


Assessment and Plan: 


Accsuzisophia SHAH


F/U Hgb A1c


Had HD on 


Status: Chronic   





(4) Hypertension


Assessment and Plan: 


Continue Home Meds at this time: Amlodipine, Coreg, Cozaar, Catapres


Hold parameters for SBP under 110


Hydralazine 10 mg IVP Q6 PRN for SBP above 160


Continue to monitor BP


Patient BP elevated early this morning- Likely situational in nature. Gave 

norvasc dose early.


Discussed the effects of life stressors on patient.


Status: Chronic   





(5) Prophylactic measure


Assessment and Plan: 


Heparin SC Q12


PPI 40 mg daily


SCDs











<Jennifer Navarro V - Last Filed: 17 11:02>





Objective





- Vital Signs/Intake and Output


Vital Signs (last 24 hours): 


 











Temp Pulse Resp BP Pulse Ox


 


 98.1 F   59 L  20   155/69 H  97 


 


 17 07:10  17 07:10  17 07:10  17 07:10  17 07:10











- Medications


Medications: 


 Current Medications





Amlodipine Besylate (Norvasc)  10 mg PO DAILY [facility]


   Last Admin: 06/15/17 11:50 Dose:  Not Given


Aspirin (Ecotrin)  81 mg PO DAILY [facility]


   Last Admin: 17 09:58 Dose:  81 mg


Calcium Acetate (Phoslo)  667 mg PO TID [facility]


   Last Admin: 17 09:58 Dose:  667 mg


Carvedilol (Coreg)  6.25 mg PO BID [facility]


   Last Admin: 17 09:58 Dose:  6.25 mg


Cinacalcet (Sensipar)  30 mg PO DAILY [facility]


   Last Admin: 17 09:58 Dose:  30 mg


Clonidine HCl (Catapres)  0.3 mg PO BID [facility]


   Last Admin: 17 09:57 Dose:  0.3 mg


Heparin Sodium (Porcine) (Heparin)  5,000 units SC Q12 [facility]


   Last Admin: 17 09:57 Dose:  5,000 units


Hydralazine HCl (Apresoline)  10 mg IVP Q6H PRN


   PRN Reason: Systolic Blood Pressure


Insulin Aspart (Novolog)  0 unit SC ACHS [facility]


   PRN Reason: Protocol


   Last Admin: 17 08:23 Dose:  2 unit


Oxcarbazepine (Trileptal)  150 mg PO DAILY [facility]


   Last Admin: 17 09:58 Dose:  150 mg


Pantoprazole Sodium (Protonix Ec Tab)  40 mg PO DAILY [facility]


   Last Admin: 17 09:58 Dose:  40 mg


Rosuvastatin Calcium (Crestor)  10 mg PO HS [facility]


   Last Admin: 06/15/17 22:22 Dose:  10 mg











- Labs


Labs: 


 





 17 08:27 





 17 08:27 





 











PT  11.0 SECONDS (9.7-12.2)   06/15/17  07:37    


 


INR  1.0   06/15/17  07:37    


 


APTT  29 SECONDS (21-34)   06/15/17  07:37    














Attending/Attestation





- Attestation


I have personally seen and examined this patient.: Yes


I have fully participated in the care of the patient.: Yes


I have reviewed all pertinent clinical information, including history, physical 

exam and plan: Yes


Notes (Text): 


This is a late computer entry for 6/15/17.


Patient seen this morning. Patient reports she had stressful conversation with 

her daughter which will influence her blood pressure. Patient denies chest pain

, denies shortness of breathe, denies palpitations. Patient scheduled for 

additional dialysis session given mildly elevated hyperkalemia by nephrology. 

Patient has completed echocardiogram, awaiting report.





Assessment & Plan


(1) Chest pain


Assessment and Plan: 


* EKG:Sinus tachy with premature supraventricular complexes noted


* Hx of coronary stent placement years ago (Pt could not quantify when)


* ASA 81 mg PO daily


* Crestor 10 mg PO qHS


* AASHISH X3: negative


* F/U Echo: pending


* Hgb A1c: 6.1 


* Thyroid studies


* Lipid Panel: T, chol: 166, LDL: 62, HDL: 66


Status: Acute   





(2) CAD with stent 


Assessment and Plan: 


* ASA 81 mg PO daily


* Crestor 10 mg PO qHS


* F/U AASHISH 2 and 3 with EKGS


* F/U Echo, Lipid Panel, Hgb A1c, Thyroid studies


* Amlodipine 10mg PO qdaily 


* Coreg 6.25mg PO bid


* Clonidine 0.3mg PO bid


* Hgb A1c: 6.1 


* Lipid Panel: T, chol: 166, LDL: 62, HDL: 66


Status: Acute   





(3) ESRD on hemodialysis


Assessment and Plan: 


* ESRD on HD M,W,F as outpatient


* Had HD on 


* Calcium Acetate 667 mg PO TID


* Consult to Nephrology  Dr. Garcia- patient's nephrologist


Status: Chronic   





(4) DM type 2 (diabetes mellitus, type 2)


Assessment and Plan: 


* Accuchecks QAC and HS


* ISS


* Hgb A1c: 6.1 


* Lipid Panel: T, chol: 166, LDL: 62, HDL: 66


* Controlled


Status: Chronic   





(5) Hypertension


Assessment and Plan: 


* Amlodipine 10mg PO qdaily 


* Coreg 6.25mg PO bid


* Clonidine 0.3mg PO bid


* ESRD on MWF


* Completed dialysis on admission


Status: Chronic   





(6) Prophylactic measure


Assessment and Plan: 


* Heparin 5000 unit SC Q12


* Protonix 40 mg daily


* SCDS b/l


Status: Acute

## 2017-06-15 NOTE — CARD
--------------- APPROVED REPORT --------------





EKG Measurement

Heart [id number]

RI [id number]

[id number]

[id number]

[id number]



<Conclusion>

Normal sinus rhythm

Cannot rule out Inferior infarct, age undetermined

Abnormal ECG

## 2017-06-15 NOTE — CARD
--------------- APPROVED REPORT --------------





EKG Measurement

Heart [id number]

SD 162P

[id number]

[id number]

[id number]



<Conclusion>

Sinus tachycardia with premature supraventricular complexes and with 

occasional premature ventricular complexes

Nonspecific ST abnormality

Abnormal ECG

## 2017-06-15 NOTE — CARD
--------------- APPROVED REPORT --------------





EKG Measurement

Heart [id number]

MS [id number]

[id number]

[id number]

[id number]



<Conclusion>

Normal sinus rhythm

Left axis deviation

Anterior infarct, age undetermined

Abnormal ECG

## 2017-06-16 VITALS — HEART RATE: 78 BPM

## 2017-06-16 VITALS
TEMPERATURE: 98.4 F | SYSTOLIC BLOOD PRESSURE: 174 MMHG | OXYGEN SATURATION: 99 % | DIASTOLIC BLOOD PRESSURE: 70 MMHG | RESPIRATION RATE: 20 BRPM

## 2017-06-16 LAB
ALBUMIN/GLOB SERPL: 1.1 {RATIO} (ref 1–2.1)
ALP SERPL-CCNC: 112 U/L (ref 38–126)
ALT SERPL-CCNC: 12 U/L (ref 9–52)
AST SERPL-CCNC: 35 U/L (ref 14–36)
BASOPHILS # BLD AUTO: 0.1 K/UL (ref 0–0.2)
BASOPHILS NFR BLD: 2.2 % (ref 0–2)
BILIRUB SERPL-MCNC: 0.6 MG/DL (ref 0.2–1.3)
BUN SERPL-MCNC: 29 MG/DL (ref 7–17)
CALCIUM SERPL-MCNC: 8.2 MG/DL (ref 8.6–10.4)
CHLORIDE SERPL-SCNC: 95 MMOL/L (ref 98–107)
CO2 SERPL-SCNC: 29 MMOL/L (ref 22–30)
EOSINOPHIL # BLD AUTO: 0.4 K/UL (ref 0–0.7)
EOSINOPHIL NFR BLD: 9.1 % (ref 0–4)
ERYTHROCYTE [DISTWIDTH] IN BLOOD BY AUTOMATED COUNT: 18 % (ref 11.5–14.5)
GLOBULIN SER-MCNC: 3.3 GM/DL (ref 2.2–3.9)
GLUCOSE SERPL-MCNC: 131 MG/DL (ref 65–105)
HCT VFR BLD CALC: 34.4 % (ref 34–47)
LYMPHOCYTES # BLD AUTO: 1.4 K/UL (ref 1–4.3)
LYMPHOCYTES NFR BLD AUTO: 33.2 % (ref 20–40)
MAGNESIUM SERPL-MCNC: 2.2 MG/DL (ref 1.6–2.3)
MCH RBC QN AUTO: 26.6 PG (ref 27–31)
MCHC RBC AUTO-ENTMCNC: 32.1 G/DL (ref 33–37)
MCV RBC AUTO: 82.9 FL (ref 81–99)
MONOCYTES # BLD: 0.6 K/UL (ref 0–0.8)
MONOCYTES NFR BLD: 14.6 % (ref 0–10)
NRBC BLD AUTO-RTO: 0 % (ref 0–2)
PHOSPHATE SERPL-MCNC: 4.8 MG/DL (ref 2.5–4.5)
PLATELET # BLD: 200 K/UL (ref 130–400)
PMV BLD AUTO: 8.5 FL (ref 7.2–11.7)
POTASSIUM SERPL-SCNC: 4.9 MMOL/L (ref 3.6–5.2)
PROT SERPL-MCNC: 6.8 G/DL (ref 6.3–8.3)
SODIUM SERPL-SCNC: 134 MMOL/L (ref 132–148)
WBC # BLD AUTO: 4.1 K/UL (ref 4.8–10.8)

## 2017-06-16 RX ADMIN — INSULIN ASPART SCH: 100 INJECTION, SOLUTION INTRAVENOUS; SUBCUTANEOUS at 18:54

## 2017-06-16 RX ADMIN — INSULIN ASPART SCH UNIT: 100 INJECTION, SOLUTION INTRAVENOUS; SUBCUTANEOUS at 12:13

## 2017-06-16 RX ADMIN — PANTOPRAZOLE SODIUM SCH MG: 40 TABLET, DELAYED RELEASE ORAL at 09:58

## 2017-06-16 RX ADMIN — INSULIN ASPART SCH UNIT: 100 INJECTION, SOLUTION INTRAVENOUS; SUBCUTANEOUS at 08:23

## 2017-06-16 NOTE — CP.PCM.PN
Subjective





- Date & Time of Evaluation


Date of Evaluation: 06/16/17


Time of Evaluation: 07:09





- Subjective


Subjective: 








PGY 1 Medicine Note- Dr. Navarro's service





Pt seen and examined at bedside. She is found resting comfortably, in no acute 

distress. Nursing reports no acute events overnight. Pt denies any episodes of 

chest pain or palpitations since admission. Pt noted on tele monitor to be 

bradycardic overnight. BP well controlled. Pt is normally dialysis MWF, but had 

extra dialysis yesterday due to elevated K. She has no complaints at this time. 

She admits tolerating diet and moving bowels without issue.





Objective





- Vital Signs/Intake and Output


Vital Signs (last 24 hours): 


 











Temp Pulse Resp BP Pulse Ox


 


 97.7 F   53 L  20   121/65   95 


 


 06/15/17 23:29  06/16/17 04:29  06/15/17 23:29  06/15/17 23:29  06/15/17 23:29











- Medications


Medications: 


 Current Medications





Amlodipine Besylate (Norvasc)  10 mg PO DAILY [facility]


   Last Admin: 06/15/17 11:50 Dose:  Not Given


Aspirin (Ecotrin)  81 mg PO DAILY [facility]


   Last Admin: 06/15/17 09:28 Dose:  81 mg


Calcium Acetate (Phoslo)  667 mg PO TID [facility]


   Last Admin: 06/15/17 18:00 Dose:  667 mg


Carvedilol (Coreg)  6.25 mg PO BID [facility]


   Last Admin: 06/15/17 18:01 Dose:  6.25 mg


Cinacalcet (Sensipar)  30 mg PO DAILY [facility]


   Last Admin: 06/15/17 09:28 Dose:  30 mg


Clonidine HCl (Catapres)  0.3 mg PO BID [facility]


   Last Admin: 06/15/17 18:02 Dose:  0.3 mg


Heparin Sodium (Porcine) (Heparin)  5,000 units SC Q12 [facility]


   Last Admin: 06/15/17 22:22 Dose:  5,000 units


Hydralazine HCl (Apresoline)  10 mg IVP Q6H PRN


   PRN Reason: Systolic Blood Pressure


Insulin Aspart (Novolog)  0 unit SC ACHS [facility]


   PRN Reason: Protocol


   Last Admin: 06/15/17 18:00 Dose:  Not Given


Oxcarbazepine (Trileptal)  150 mg PO DAILY [facility]


   Last Admin: 06/15/17 09:28 Dose:  150 mg


Pantoprazole Sodium (Protonix Ec Tab)  40 mg PO DAILY [facility]


   Last Admin: 06/15/17 09:28 Dose:  40 mg


Rosuvastatin Calcium (Crestor)  10 mg PO HS [facility]


   Last Admin: 06/15/17 22:22 Dose:  10 mg











- Labs


Labs: 


 





 06/15/17 07:37 





 06/15/17 07:37 





 











PT  11.0 SECONDS (9.7-12.2)   06/15/17  07:37    


 


INR  1.0   06/15/17  07:37    


 


APTT  29 SECONDS (21-34)   06/15/17  07:37    














- Additional Findings


Additional findings: 





- Constitutional


Appears: Non-toxic, No Acute Distress





- Head Exam


Head Exam: ATRAUMATIC, NORMAL INSPECTION, NORMOCEPHALIC





- Eye Exam


Eye Exam: EOMI, Normal appearance, PERRL


Pupil Exam: NORMAL ACCOMODATION





- ENT Exam


ENT Exam: Mucous Membranes Moist





- Neck Exam


Neck Exam: Full ROM





- Respiratory Exam


Respiratory Exam: NORMAL BREATHING PATTERN.  absent: Wheezes





- Cardiovascular Exam


Cardiovascular Exam: +S1, +S2





- GI/Abdominal Exam


GI & Abdominal Exam: Soft, Normal Bowel Sounds





- Extremities Exam


Extremities Exam: Full ROM, Normal Capillary Refill





- Back Exam


Back Exam: Full ROM





- Neurological Exam


Neurological Exam: Alert, Awake, CN II-XII Intact, Oriented x3





- Psychiatric Exam


Psychiatric exam: Normal Affect, Normal Mood





- Skin


Skin Exam: Dry, Intact, Normal Color, Warm





Assessment and Plan





- Assessment and Plan (Free Text)


Plan: 





(1) Chest pain, rule out acute myocardial infarction


Assessment and Plan: 


EKG:Sinus tachy with premature supraventricular complexes noted


Hx of coronary stent placement years ago (Pt could not quantify when)


ASA 81 mg, Crestor 10 mg


ROMIs negative x3 EKGS


Echo (6/16/17): f/u official read


Lipid Panel WNL


Hgb A1c: 6.2 


Thyroid studies WNL


  





(2) ESRD on hemodialysis


Assessment and Plan: 


ESRD typically on HD M,W,F . Had dialysis yesterday due to elevated 

electrolytes.


Calcium Acetate 667 mg PO TID


Consult to Nephrology  Dr. Garcia- F/U  





(3) DM type 2 (diabetes mellitus, type 2)


Assessment and Plan: 


Accuchecks


ISS


Hgb A1c: 6.1, well controlled


Had HD on 6/14 





(4) Hypertension


Assessment and Plan: 


Continue Home Meds at this time: Amlodipine, Coreg, Cozaar, Catapres


Hold parameters for SBP under 110


Hydralazine 10 mg IVP Q6 PRN for SBP above 160


Continue to monitor BP


Discussed the effects of life stressors on patient. 





(5) Prophylactic measure


Assessment and Plan: 


Heparin SC Q12


PPI 40 mg daily


SCDs

## 2017-06-16 NOTE — CP.PCM.PN
Subjective





- Date & Time of Evaluation


Date of Evaluation: 06/16/17


Time of Evaluation: 13:39





- Subjective


Subjective: 





No CPs, no SOB,n, v, d, chills, HAs


s/p echo- results pending


BP controlled


Labs acceptable


For dialysis now- limit UF rate as BP drops post HD





Objective





- Vital Signs/Intake and Output


Vital Signs (last 24 hours): 


 











Temp Pulse Resp BP Pulse Ox


 


 98.1 F   59 L  20   155/69 H  97 


 


 06/16/17 07:10  06/16/17 07:10  06/16/17 07:10  06/16/17 07:10  06/16/17 07:10











- Medications


Medications: 


 Current Medications





Amlodipine Besylate (Norvasc)  10 mg PO DAILY [facility]


   Last Admin: 06/15/17 11:50 Dose:  Not Given


Aspirin (Ecotrin)  81 mg PO DAILY [facility]


   Last Admin: 06/16/17 09:58 Dose:  81 mg


Calcium Acetate (Phoslo)  667 mg PO TID [facility]


   Last Admin: 06/16/17 09:58 Dose:  667 mg


Carvedilol (Coreg)  6.25 mg PO BID [facility]


   Last Admin: 06/16/17 09:58 Dose:  6.25 mg


Cinacalcet (Sensipar)  30 mg PO DAILY [facility]


   Last Admin: 06/16/17 09:58 Dose:  30 mg


Clonidine HCl (Catapres)  0.3 mg PO BID [facility]


   Last Admin: 06/16/17 09:57 Dose:  0.3 mg


Heparin Sodium (Porcine) (Heparin)  5,000 units SC Q12 [facility]


   Last Admin: 06/16/17 09:57 Dose:  5,000 units


Hydralazine HCl (Apresoline)  10 mg IVP Q6H PRN


   PRN Reason: Systolic Blood Pressure


Insulin Aspart (Novolog)  0 unit SC ACHS [facility]


   PRN Reason: Protocol


   Last Admin: 06/16/17 12:13 Dose:  1 unit


Oxcarbazepine (Trileptal)  150 mg PO DAILY [facility]


   Last Admin: 06/16/17 09:58 Dose:  150 mg


Pantoprazole Sodium (Protonix Ec Tab)  40 mg PO DAILY [facility]


   Last Admin: 06/16/17 09:58 Dose:  40 mg


Rosuvastatin Calcium (Crestor)  10 mg PO HS [facility]


   Last Admin: 06/15/17 22:22 Dose:  10 mg











- Labs


Labs: 


 





 06/16/17 08:27 





 06/16/17 08:27 





 











PT  11.0 SECONDS (9.7-12.2)   06/15/17  07:37    


 


INR  1.0   06/15/17  07:37    


 


APTT  29 SECONDS (21-34)   06/15/17  07:37    














- Constitutional


Appears: No Acute Distress, Chronically Ill





- Head Exam


Head Exam: ATRAUMATIC, NORMAL INSPECTION





- Eye Exam


Eye Exam: EOMI, Normal appearance





- Neck Exam


Neck Exam: Normal Inspection.  absent: Tenderness





- Respiratory Exam


Respiratory Exam: Clear to Ausculation Bilateral, NORMAL BREATHING PATTERN





- Cardiovascular Exam


Cardiovascular Exam: REGULAR RHYTHM, +S1





- GI/Abdominal Exam


GI & Abdominal Exam: Soft.  absent: Tenderness





- Extremities Exam


Extremities Exam: Normal Inspection.  absent: Tenderness





- Neurological Exam


Neurological Exam: Alert, CN II-XII Intact





- Skin


Skin Exam: Dry, Warm





Assessment and Plan


(1) Type 2 diabetes mellitus with diabetic nephropathy


Status: Acute   





(2) ESRD on hemodialysis


Status: Chronic   





(3) CAD (coronary artery disease)


Status: Acute   





(4) Chest pain of uncertain etiology


Status: Acute   





- Assessment and Plan (Free Text)


Plan: 





For dialysis now


Check echo results


Further cardiac workup as per medicine

## 2017-06-16 NOTE — CP.PCM.DIS
Provider





- Provider


Date of Admission: 


06/14/17 13:46





Attending physician: 


Jennifer Navarro DO





Primary care physician: 





Dr. CARMEN Mars (PMD/Cardio)


Dr. Navarro (Hospitalist)


Consults: 





Nephrology: Jose


Time Spent in preparation of Discharge (in minutes): 45





Diagnosis





- Discharge Diagnosis


(1) Chest pain, rule out acute myocardial infarction


Status: Acute   


Comment: Hx of coronary stent.  AASHISH negative x 3; EKG: no acute ST changes   





(2) ESRD (end stage renal disease)


Status: Chronic   


Comment: Nephro: Dr. Garcia consulted.  MWF dialysis + Thursday for elevated K 

  





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


Status: Acute   


Comment: A1C: 6.2, adequate control for diabetic   





Hospital Course





- Lab Results


Lab Results: 


 Most Recent Lab Values











WBC  4.1 K/uL (4.8-10.8)  L  06/16/17  08:27    


 


RBC  4.14 Mil/uL (3.80-5.20)   06/16/17  08:27    


 


Hgb  11.0 g/dL (11.0-16.0)   06/16/17  08:27    


 


Hct  34.4 % (34.0-47.0)   06/16/17  08:27    


 


MCV  82.9 fL (81.0-99.0)   06/16/17  08:27    


 


MCH  26.6 pg (27.0-31.0)  L  06/16/17  08:27    


 


MCHC  32.1 g/dL (33.0-37.0)  L  06/16/17  08:27    


 


RDW  18.0 % (11.5-14.5)  H  06/16/17  08:27    


 


Plt Count  200 K/uL (130-400)   06/16/17  08:27    


 


MPV  8.5 fL (7.2-11.7)   06/16/17  08:27    


 


Neut % (Auto)  40.9 % (50.0-75.0)  L  06/16/17  08:27    


 


Lymph % (Auto)  33.2 % (20.0-40.0)   06/16/17  08:27    


 


Mono % (Auto)  14.6 % (0.0-10.0)  H  06/16/17  08:27    


 


Eos % (Auto)  9.1 % (0.0-4.0)  H  06/16/17  08:27    


 


Baso % (Auto)  2.2 % (0.0-2.0)  H  06/16/17  08:27    


 


Neut #  1.7 K/uL (1.8-7.0)  L  06/16/17  08:27    


 


Lymph #  1.4 K/uL (1.0-4.3)   06/16/17  08:27    


 


Mono #  0.6 K/uL (0.0-0.8)   06/16/17  08:27    


 


Eos #  0.4 K/uL (0.0-0.7)   06/16/17  08:27    


 


Baso #  0.1 K/uL (0.0-0.2)   06/16/17  08:27    


 


PT  11.0 SECONDS (9.7-12.2)   06/15/17  07:37    


 


INR  1.0   06/15/17  07:37    


 


APTT  29 SECONDS (21-34)   06/15/17  07:37    


 


Sodium  134 mmol/L (132-148)   06/16/17  08:27    


 


Potassium  4.9 mmol/L (3.6-5.2)   06/16/17  08:27    


 


Chloride  95 mmol/L ()  L  06/16/17  08:27    


 


Carbon Dioxide  29 mmol/L (22-30)   06/16/17  08:27    


 


Anion Gap  15  (10-20)   06/16/17  08:27    


 


BUN  29 mg/dL (7-17)  H  06/16/17  08:27    


 


Creatinine  5.8 MG/DL (0.7-1.2)  H  06/16/17  08:27    


 


Est GFR ( Amer)  9   06/16/17  08:27    


 


Est GFR (Non-Af Amer)  7   06/16/17  08:27    


 


POC Glucose (mg/dL)  114 mg/dL ()  H  06/16/17  17:35    


 


Random Glucose  131 mg/dL ()  H  06/16/17  08:27    


 


Hemoglobin A1c  6.1 % (4.2-6.5)   06/15/17  07:37    


 


Calcium  8.2 mg/dl (8.6-10.4)  L  06/16/17  08:27    


 


Phosphorus  4.8 mg/dL (2.5-4.5)  H  06/16/17  08:27    


 


Magnesium  2.2 mg/dL (1.6-2.3)   06/16/17  08:27    


 


Total Bilirubin  0.6 mg/dL (0.2-1.3)   06/16/17  08:27    


 


AST  35 U/L (14-36)   06/16/17  08:27    


 


ALT  12 U/L (9-52)   06/16/17  08:27    


 


Alkaline Phosphatase  112 U/L ()   06/16/17  08:27    


 


Total Creatine Kinase  68 U/L ()   06/14/17  23:11    


 


CK-MB (Mass)  2.44 ng/mL (0.0-3.38)   06/14/17  23:11    


 


Troponin I  0.0470 ng/mL (0.00-0.120)   06/14/17  10:48    


 


Troponin I, Quant  0.1150 ng/mL (0.00-0.120)   06/14/17  23:11    


 


NT-Pro-B Natriuret Pep  27075 pg/mL (0-900)  H  06/14/17  10:48    


 


Total Protein  6.8 g/dL (6.3-8.3)   06/16/17  08:27    


 


Albumin  3.5 g/dL (3.5-5.0)  D 06/16/17  08:27    


 


Globulin  3.3 gm/dL (2.2-3.9)   06/16/17  08:27    


 


Albumin/Globulin Ratio  1.1  (1.0-2.1)   06/16/17  08:27    


 


Triglycerides  66 mg/dL (0-149)   06/15/17  07:37    


 


Cholesterol  148 mg/dL (0-199)   06/15/17  07:37    


 


LDL Cholesterol Direct  62 mg/dL (0-129)   06/15/17  07:37    


 


HDL Cholesterol  60 mg/dL (30-70)   06/15/17  07:37    


 


Free T4  1.22 ng/dL (0.78-2.19)   06/15/17  07:37    


 


TSH 3rd Generation  0.89 mIU/L (0.46-4.68)   06/15/17  07:37    














- Hospital Course


Hospital Course: 





On admission:


77 year old female with extensive PMHx significant for coronary stent placement

, HTN, DM, ESRD on HD and CVA presents with chief complaint of chest pain 

earlier today while at dialysis. Patient states that she was at hemodialysis 

center when she passed out on the bed. Patient states that she was experiencing 

some chest pain at the time and was administered 4 baby aspirins .  The pain 

was described as a sharp 9/10 pain without radiation.  She denied associations 

with ambulation, respirations or body positions. EMS was called and patient was 

brought for evaluation. Patient states that she has never experienced such an 

occurrence. She states that her symptoms have now resolved.  Patient now denies 

chest pain, palpitaitons, shortness of breath, vision changes, headaches, 

abdominal pain, nausea paresthesias at this time. 








Hospital course:


Pt admitted on 6/14/17 for chest pain. EKG on presentation showed sinus tach w/ 

premature supraventricular complexes. AASHISH panel negative x 3. Pt given 325mg 

ASA in field, 81mg daily. Chronic HTN controlled with Amlodipine, Coreg, 

Clonidine, over course. Chronic T2DM treated with insulin sliding scale over 

course.  Pt was maintained on MWF dialysis schedule, with additional dialysis 

on Thursday due to high serum potassium, supervised by nephrology consultant, 

Dr. Garcia. ECHO showed moderate concentric LVH, EF was WNL. With no additional 

chest pain, pt was discharged on 6/16, after dialysis. 





- Date & Time of H&P


Date of H&P: 06/14/17


Time of H&P: 14:10





Discharge Exam





- Additional Findings


Additional findings: 





- Constitutional


Appears: Non-toxic, No Acute Distress





- Head Exam


Head Exam: ATRAUMATIC, NORMAL INSPECTION, NORMOCEPHALIC





- Eye Exam


Eye Exam: EOMI, Normal appearance, PERRL


Pupil Exam: NORMAL ACCOMODATION





- ENT Exam


ENT Exam: Mucous Membranes Moist





- Neck Exam


Neck Exam: Full ROM





- Respiratory Exam


Respiratory Exam: NORMAL BREATHING PATTERN.  absent: Wheezes





- Cardiovascular Exam


Cardiovascular Exam: +S1, +S2





- GI/Abdominal Exam


GI & Abdominal Exam: Soft, Normal Bowel Sounds





- Extremities Exam


Extremities Exam: Full ROM, Normal Capillary Refill





- Back Exam


Back Exam: Full ROM





- Neurological Exam


Neurological Exam: Alert, Awake, CN II-XII Intact, Oriented x3





- Psychiatric Exam


Psychiatric exam: Normal Affect, Normal Mood





- Skin


Skin Exam: Dry, Intact, Normal Color, Warm





Discharge Plan





- Follow Up Plan


Condition: GOOD


Disposition: HOME/ ROUTINE


Instructions:  Chest Pain (DC), Hemodialysis (DC), Renal Failure Diet (DC), 

Dialysis Diet (DC), End Stage Kidney Disease (DC)


Additional Instructions: 


Patient is stable for discharge per Dr. Navarro.





Patient may resume her home medications. She has not been prescribed any 

medications.





Patient is make an appointment with her PMD/Cardiologist Dr. Adams, within 

one week of discharge for follow-up. She should also make an appointment to 

follow up with specialist, Dr. Garcia to continue evaluation of her end stage 

renal disease.





She is advised to return to the emergency department if symptoms return or 

worsen. These instructions were given to the pt in English. Patient expressed 

verbal understanding of these instructions.





Prescribed medications:


NONE


Referrals: 


Guevara Garcia MD [Staff Provider] - 


Tatiana Adams MD [Staff Provider] -

## 2017-06-17 NOTE — CARD
--------------- APPROVED REPORT --------------





EXAM: Two-dimensional and M-mode echocardiogram with Doppler and 

color Doppler.



Other Information 

Quality : Rhythm : NSR



INDICATION

CAD Chest Pain Congestive Heart Failure 



RISK FACTORS

Hypertension 

Hyperlipidemia

Diabetes



M-Mode DIMENSIONS 

RVDd2.50   (2.1-3.2cm)Left Atrium (MM)4.65   (2.5-4.0cm)

IVSd1.48   (0.7-1.1cm)Aortic Root2.77   (2.2-3.7cm)

LVDd5.15   (4.0-5.6cm)Aortic Cusp Exc.1.64   (1.5-2.0cm)

PWd1.52   (0.7-1.1cm)FS (%) 38   %

LVDs3.20   (2.0-3.8cm)LVEF (%)68   (>50%)



Aortic Valve

AoV Peak Dfkielww283.1cm/Lisbeth Peak GR.12mmHg



Mitral Valve

MV E Ffylypbb913.5cm/sMV A Dwxnuglw08.2cm/sE/A ratio1.8



TDI

E/Lateral E'0.0E/Medial E'0.0



Tricuspid Valve

TR Peak Ckidnasv195pv/sTR Peak Gr.[id number]



 LEFT VENTRICLE 

There is mild to moderate concentric left ventricular hypertrophy.

Left ventricle systolic function is normal with Ejection Fraction of 

65-70%.

There is normal LV segmental wall motion.

The left ventricular diastolic function is normal.

No left ventricle thrombus noted on this study.



 RIGHT VENTRICLE 

The right ventricle is normal size.

The right ventricular systolic function is normal.



 ATRIA 

The left atrium is mildly dilated. 

The right atrium size is normal.



 AORTIC VALVE 

The aortic valve is mildly to moderately sclerotic.

The aortic valve is trileaflet.

No aortic regurgitation is present.

There is no aortic valvular stenosis. 



 MITRAL VALVE 

Mitral annular calcification is mild to moderate.

There is no evidence of mitral valve prolapse.

There is no mitral valve stenosis.

Mitral regurgitation is  trace to mild.



 TRICUSPID VALVE 

The tricuspid valve is normal in structure.

There is mild to moderate tricuspid regurgitation.

Right ventricular systolic pressure is estimated at 50-60 mmHg. 

There is moderate pulmonary hypertension.

There is no tricuspid valve prolapse or vegetation.

There is no tricuspid valve stenosis. 



 PULMONIC VALVE 

The pulmonic valve is not well visualized.

There is trace pulmonic valvular regurgitation. 



 GREAT VESSELS 

The aortic root is normal in size.

The IVC is normal in size and collapses >50% with inspiration.



 PERICARDIAL EFFUSION 

There is no pericardial effusion.

There is no pleural effusion.



<Conclusion>

There is mild to moderate concentric left ventricular hypertrophy.

Left ventricle systolic function is normal with Ejection Fraction of 

65-70%.

The left ventricular diastolic function is normal.

The right ventricular size and systolic function are normal.

The left atrium is mildly dilated.

The right atrium size is normal.

Mitral regurgitation is  trace to mild.

There is mild to moderate tricuspid regurgitation.

There is moderate pulmonary hypertension.

## 2017-06-26 ENCOUNTER — HOSPITAL ENCOUNTER (INPATIENT)
Dept: HOSPITAL 31 - C.ER | Age: 77
LOS: 3 days | Discharge: HOME | DRG: 308 | End: 2017-06-29
Attending: INTERNAL MEDICINE | Admitting: INTERNAL MEDICINE
Payer: MEDICARE

## 2017-06-26 VITALS — BODY MASS INDEX: 25 KG/M2

## 2017-06-26 DIAGNOSIS — D64.9: ICD-10-CM

## 2017-06-26 DIAGNOSIS — I48.91: Primary | ICD-10-CM

## 2017-06-26 DIAGNOSIS — E11.21: ICD-10-CM

## 2017-06-26 DIAGNOSIS — I13.2: ICD-10-CM

## 2017-06-26 DIAGNOSIS — I16.0: ICD-10-CM

## 2017-06-26 DIAGNOSIS — I25.10: ICD-10-CM

## 2017-06-26 DIAGNOSIS — Z99.2: ICD-10-CM

## 2017-06-26 DIAGNOSIS — I50.9: ICD-10-CM

## 2017-06-26 DIAGNOSIS — N18.6: ICD-10-CM

## 2017-06-26 DIAGNOSIS — E11.22: ICD-10-CM

## 2017-06-26 DIAGNOSIS — E03.9: ICD-10-CM

## 2017-06-26 DIAGNOSIS — J45.909: ICD-10-CM

## 2017-06-26 LAB
ALBUMIN/GLOB SERPL: 1.1 {RATIO} (ref 1–2.1)
ALP SERPL-CCNC: 159 U/L (ref 38–126)
ALT SERPL-CCNC: 20 U/L (ref 9–52)
APTT BLD: 29 SECONDS (ref 21–34)
AST SERPL-CCNC: 35 U/L (ref 14–36)
BASOPHILS # BLD AUTO: 0.1 K/UL (ref 0–0.2)
BASOPHILS NFR BLD: 1.3 % (ref 0–2)
BILIRUB SERPL-MCNC: 0.8 MG/DL (ref 0.2–1.3)
BUN SERPL-MCNC: 38 MG/DL (ref 7–17)
CALCIUM SERPL-MCNC: 8.6 MG/DL (ref 8.6–10.4)
CHLORIDE SERPL-SCNC: 88 MMOL/L (ref 98–107)
CO2 SERPL-SCNC: 32 MMOL/L (ref 22–30)
EOSINOPHIL # BLD AUTO: 0.3 K/UL (ref 0–0.7)
EOSINOPHIL NFR BLD: 5.6 % (ref 0–4)
ERYTHROCYTE [DISTWIDTH] IN BLOOD BY AUTOMATED COUNT: 16.9 % (ref 11.5–14.5)
GLOBULIN SER-MCNC: 3.4 GM/DL (ref 2.2–3.9)
GLUCOSE SERPL-MCNC: 122 MG/DL (ref 65–105)
HCT VFR BLD CALC: 34.5 % (ref 34–47)
INR PPP: 0.9
LYMPHOCYTES # BLD AUTO: 1.1 K/UL (ref 1–4.3)
LYMPHOCYTES NFR BLD AUTO: 20.1 % (ref 20–40)
MCH RBC QN AUTO: 26.6 PG (ref 27–31)
MCHC RBC AUTO-ENTMCNC: 32.8 G/DL (ref 33–37)
MCV RBC AUTO: 81 FL (ref 81–99)
MONOCYTES # BLD: 0.7 K/UL (ref 0–0.8)
MONOCYTES NFR BLD: 12.4 % (ref 0–10)
NRBC BLD AUTO-RTO: 0 % (ref 0–2)
PLATELET # BLD: 238 K/UL (ref 130–400)
PMV BLD AUTO: 9.2 FL (ref 7.2–11.7)
POTASSIUM SERPL-SCNC: 3.4 MMOL/L (ref 3.6–5.2)
PROT SERPL-MCNC: 7.2 G/DL (ref 6.3–8.3)
SODIUM SERPL-SCNC: 133 MMOL/L (ref 132–148)
TROPONIN I SERPL-MCNC: 0.05 NG/ML (ref 0–0.12)
WBC # BLD AUTO: 5.6 K/UL (ref 4.8–10.8)

## 2017-06-26 PROCEDURE — 5A1D60Z: ICD-10-PCS | Performed by: INTERNAL MEDICINE

## 2017-06-26 RX ADMIN — INSULIN ASPART SCH: 100 INJECTION, SOLUTION INTRAVENOUS; SUBCUTANEOUS at 22:04

## 2017-06-26 NOTE — CP.PCM.CON
History of Present Illness





- History of Present Illness


History of Present Illness: 








78 y/o F c PMHx DM, CAD s/p stent, ESRD on HD MWF, presents to the emergency 

department with complaints of chest pain, shortness of breath and nausea. 

Patient went to normally scheduled dialysis this morning and completed one hour

, after which shedeveloped these symptoms, which lasted for a few seconds.. In 

ED, patient states she feels well and is hungry. Denies abdominal pain, fevers, 

palpitations, bleeding or black stool. Patient noted to be in atrial 

fibrillation by EMS, which she denies a Hx of. 








PMH:


ESRD


DIABETIC NEPHROPATHY


CAD


CHF EPISODES





PSH:


CARDIAC STENT


AVF








CONSULT DICTATED





ADMITTED FOR NEW ONSET AFIB





Past Patient History





- Infectious Disease


Hx of Infectious Diseases: None





- Tetanus Immunizations


Tetanus Immunization: Unknown





- Past Medical History & Family History


Past Medical History?: Yes





- Past Social History


Smoking Status: Never Smoked





- CARDIAC


Hx Hypertension: Yes





- PULMONARY


Hx Asthma: Yes





- NEUROLOGICAL


Hx Neurological Disorder: No





- HEENT


Hx HEENT Problems: Yes


Other/Comment: wear eyeglasses





- RENAL


Hx Chronic Kidney Disease: Yes


Hx Kidney Stones: No





- ENDOCRINE/METABOLIC


Hx Hypothyroidism: Yes





- HEMATOLOGICAL/ONCOLOGICAL


Hx Anemia: Yes





- INTEGUMENTARY


Hx Dermatological Problems: No





- MUSCULOSKELETAL/RHEUMATOLOGICAL


Hx Musculoskeletal Disorders: No


Hx Falls: No





- GASTROINTESTINAL


Hx Gastrointestinal Disorders: No





- GENITOURINARY/GYNECOLOGICAL


Hx Genitourinary Disorders: No





- PSYCHIATRIC


Hx Substance Use: No





- SURGICAL HISTORY


Hx Coronary Stent: Yes





- ANESTHESIA


Hx Anesthesia: Yes


Hx Anesthesia Reactions: No


Hx Malignant Hyperthermia: No





Meds


Allergies/Adverse Reactions: 


 Allergies











Allergy/AdvReac Type Severity Reaction Status Date / Time


 


No Known Allergies Allergy   Verified 06/26/17 09:13














Results





- Vital Signs


Recent Vital Signs: 


 Last Vital Signs











Temp  97.8 F   06/26/17 09:09


 


Pulse  65   06/26/17 12:40


 


Resp  20   06/26/17 12:40


 


BP  144/59 L  06/26/17 12:40


 


Pulse Ox  97   06/26/17 12:40














- Labs


Result Diagrams: 


 06/26/17 10:01





 06/26/17 10:01


Labs: 


 Laboratory Results - last 24 hr











  06/26/17 06/26/17 06/26/17





  10:01 10:01 10:01


 


WBC  5.6  


 


RBC  4.26  


 


Hgb  11.3  


 


Hct  34.5  


 


MCV  81.0  


 


MCH  26.6 L  


 


MCHC  32.8 L  


 


RDW  16.9 H  


 


Plt Count  238  


 


MPV  9.2  


 


Neut % (Auto)  60.6  


 


Lymph % (Auto)  20.1  


 


Mono % (Auto)  12.4 H  


 


Eos % (Auto)  5.6 H  


 


Baso % (Auto)  1.3  


 


Neut #  3.4  


 


Lymph #  1.1  


 


Mono #  0.7  


 


Eos #  0.3  


 


Baso #  0.1  


 


PT   10.6 


 


INR   0.9 


 


APTT   29 


 


Sodium    133


 


Potassium    3.4 L


 


Chloride    88 L


 


Carbon Dioxide    32 H


 


Anion Gap    16


 


BUN    38 H


 


Creatinine    5.4 H


 


Est GFR ( Amer)    9


 


Est GFR (Non-Af Amer)    8


 


Random Glucose    122 H


 


Calcium    8.6


 


Total Bilirubin    0.8


 


AST    35


 


ALT    20


 


Alkaline Phosphatase    159 H D


 


Total Creatine Kinase    99


 


CK-MB (Mass)    2.27


 


Troponin I    0.0470


 


Total Protein    7.2


 


Albumin    3.9


 


Globulin    3.4


 


Albumin/Globulin Ratio    1.1


 


Lipase    132

## 2017-06-26 NOTE — C.PDOC
History Of Present Illness


78 y/o F c PMHx DM, CAD s/p stent, ESRD on HD MWF, presents to the emergency 

department with complaints of chest pain, shortness of breath and nausea. 

Patient went to normally scheduled dialysis this morning and completed one hour

, after which shedeveloped these symptoms, which lasted for a few seconds.. In 

ED, patient states she feels well and is hungry. Denies abdominal pain, fevers, 

palpitations, bleeding or black stool. Patient noted to be in atrial 

fibrillation by EMS, which she denies a Hx of. 


Time Seen by Provider: 06/26/17 09:32


Chief Complaint (Nursing): Shortness Of Breath





Past Medical History


Vital Signs: 


 Last Vital Signs











Temp  97.8 F   06/26/17 09:09


 


Pulse  63   06/26/17 14:50


 


Resp  20   06/26/17 14:50


 


BP  149/61   06/26/17 14:50


 


Pulse Ox  100   06/26/17 14:50














- Medical History


PMH: Anemia, Asthma, CAD, Diabetes, HTN, Hypothyroidism, End Stage Renal 

Disease (3 x a week  m-w-f), Chronic Kidney Disease


   Denies: Kidney Stones


Surgical History: Coronary Stent





- [facility] Procedures








ASPIRATION SKIN & SUBQ (05/24/14)


EXCISION OF RIGHT LOBE LIVER, PERCUTANEOUS APPROACH, DIAGN (05/02/16)


FLUOROSCOPY OF GALLBLADDER & BILE DUCT USING L OSM CONTRAST (05/02/16)


HEMODIALYSIS (03/20/15)


PERFORMANCE OF URINARY FILTRATION, SINGLE (10/03/16)


RESECTION OF GALLBLADDER, PERCUTANEOUS ENDOSCOPIC APPROACH (05/02/16)








Family History: States: Unknown Family Hx





- Social History


Hx Tobacco Use: No


Hx Alcohol Use: No


Hx Substance Use: No





- Immunization History


Hx Tetanus Toxoid Vaccination: No


Hx Influenza Vaccination: No


Hx Pneumococcal Vaccination: No





Review Of Systems


Except As Marked, All Systems Reviewed And Found Negative.


Constitutional: Negative for: Fever


Cardiovascular: Positive for: Chest Pain


Respiratory: Positive for: Shortness of Breath.  Negative for: Cough


Gastrointestinal: Positive for: Nausea.  Negative for: Vomiting, Abdominal Pain


Musculoskeletal: Negative for: Back Pain


Skin: Negative for: Rash


Neurological: Negative for: Weakness, Numbness, Headache, Dizziness





Physical Exam





- Physical Exam


Additional Physical Exam Comments: 


Constitutional: No acute distress.


Head: Normocephalic.  Atraumatic.  


Eyes:  PERRL. EOMI


ENT:  Moist mucous membranes.


Neck:  Supple.


Cardiovascular: Irregularly irregular. Radial pulses 2+ bilaterally.


Chest: No tenderness.


Respiratory:  Clear to auscultation bilaterally.


GI:  Soft. Nontender.  Nondistended. 


Back: No CVA tenderness. No midline tenderness.


Musculoskeletal:  No tenderness or swelling of extremities.


Skin:  No rash. 


Neurologic:  Alert, no focal deficit. 





ED Course And Treatment





- Laboratory Results


Result Diagrams: 


 06/26/17 10:01





 06/26/17 10:01


O2 Sat by Pulse Oximetry: 100





Medical Decision Making


Medical Decision Making: 


Plan:


* EKG


* Labs


* Chest X-Ray


* Aspirin


* Reassess and Disposition





EKG


Rate   105bpm


Rhythm   Atrial Fibrillation


Interpret:   PVCs. No ST elevations.





CXR no acute disease. 





Dr. Garcia accepts consult for nephrology. Dr. SHASHI Levin accepts patient to 

medical service.





Disposition





- Disposition


Disposition: HOSPITALIZED


Disposition Time: 11:50


Condition: FAIR





- Clinical Impression


Clinical Impression: 


 New onset atrial fibrillation








- PA / NP / Resident Statement


MD/DO has reviewed & agrees with the documentation as recorded.





- Scribe Statement


The provider has reviewed the documentation as recorded by the Scribe (Marge Perrin)








All medical record entries made by the Scribe were at my direction and 

personally dictated by me. I have reviewed the chart and agree that the record 

accurately reflects my personal performance of the history, physical exam, 

medical decision making, and the department course for this patient. I have 

also personally directed, reviewed, and agree with the discharge instructions 

and disposition.

## 2017-06-26 NOTE — CP.PCM.HP
Past Patient History





- Infectious Disease


Hx of Infectious Diseases: None





- Tetanus Immunizations


Tetanus Immunization: Unknown





- Past Medical History & Family History


Past Medical History?: Yes





- Past Social History


Smoking Status: Never Smoked





- CARDIAC


Hx Cardiac Disorders: Yes


Hx Hypertension: Yes





- PULMONARY


Hx Respiratory Disorders: Yes


Hx Asthma: Yes





- NEUROLOGICAL


Hx Neurological Disorder: No





- HEENT


Hx HEENT Problems: Yes


Other/Comment: wear eyeglasses





- RENAL


Hx Chronic Kidney Disease: Yes


Date of Last Dialysis Treatment: 06/14/17


Hx Kidney Stones: No





- ENDOCRINE/METABOLIC


Hx Endocrine Disorders: Yes


Hx Hypothyroidism: Yes





- HEMATOLOGICAL/ONCOLOGICAL


Hx Blood Disorders: Yes


Hx Anemia: Yes





- INTEGUMENTARY


Hx Dermatological Problems: No





- MUSCULOSKELETAL/RHEUMATOLOGICAL


Hx Musculoskeletal Disorders: No


Hx Falls: No





- GASTROINTESTINAL


Hx Gastrointestinal Disorders: No





- GENITOURINARY/GYNECOLOGICAL


Hx Genitourinary Disorders: No





- PSYCHIATRIC


Hx Psychophysiologic Disorder: No


Hx Substance Use: No





- SURGICAL HISTORY


Hx Surgeries: Yes


Hx Coronary Stent: Yes





- ANESTHESIA


Hx Anesthesia: Yes


Hx Anesthesia Reactions: No


Hx Malignant Hyperthermia: No





Meds


Allergies/Adverse Reactions: 


 Allergies











Allergy/AdvReac Type Severity Reaction Status Date / Time


 


No Known Allergies Allergy   Verified 06/26/17 09:13














Results





- Vital Signs


Recent Vital Signs: 





 Last Vital Signs











Temp  98.6 F   06/26/17 17:15


 


Pulse  66   06/26/17 17:15


 


Resp  20   06/26/17 17:15


 


BP  159/64 H  06/26/17 16:17


 


Pulse Ox  100   06/26/17 17:15














- Labs


Result Diagrams: 


 06/26/17 10:01





 06/26/17 10:01


Labs: 





 Laboratory Results - last 24 hr











  06/26/17





  17:28


 


POC Glucose (mg/dL)  169 H














Assessment & Plan





- Assessment and Plan (Free Text)


Plan: 





protonix


elquis


dr.cooper dr.voudourous rodriguez same


as ordered


prpotoonix


home emd reconciliation


as ordered

## 2017-06-26 NOTE — CON
DATE: 06/26/2017



The patient is a 77-year-old  woman with end-stage renal disease.  The patient had di
alysis today for approximately 2 hours, when she had some chest pain.  She was found to be tachycardi
c and in atrial fibrillation.  This is a new onset atrial fibrillation.  Was sent to the Emergency De
partment.  She is being admitted for new onset atrial fibrillation.



PAST MEDICAL HISTORY:  She has past medical history of end-stage renal disease, diabetic nephropathy,
 coronary artery disease, and has had recent congestive heart failure episode.



PAST SURGICAL HISTORY:  Cardiac stent placement, AV fistula placement.



SOCIAL HISTORY:  Negative for smoking, alcohol abuse, illicit drug use.



FAMILY HISTORY:  Significant for son on maintenance hemodialysis.



REVIEW OF SYSTEMS:  She was well recently, but did have dyspnea on exertion a few weeks ago, which ha
s improved with improvement with dialysis, fluid removal.  She has had no chest pain except for today
.  She has no new rashes.  No nausea, vomiting, diarrhea.  No visual disturbance, hearing deficits.  
Other review of systems are all negative.



MEDICATIONS:  Include antihyperglycemic medication, a blood pressure medication, and aspirin 325 mayra
y, and PhosLo 1 tablet t.i.d.  The patient cannot remember the names for medications.



PHYSICAL EXAMINATION:

GENERAL:  She is a well-developed female in no acute distress.

VITAL SIGNS:  Blood pressure 144/59, temperature 97.8, pulse now is 72 at bedside, and rhythm is regu
lar.  Pulse ox 97% on room air.

HEENT:  Anicteric.  Mouth was clear.

NECK:  No JVD.

LUNGS:  Lung fields were clear.

HEART:  Regular rhythm, no murmur.

ABDOMEN:  Soft, benign.  No mass or organomegaly.

EXTREMITIES:  No peripheral edema.  She has a left upper extremity AV fistula with a thrill and a bru
it.

NEUROLOGIC:  No focal deficits.



LABORATORY DATA:  Blood work shows hemoglobin 11.3, potassium 3.4.  That is post-dialysis.  Creatinin
e 5.4.  Troponin is normal.



IMPRESSION:  New onset atrial fibrillation, coronary artery disease, history of congestive heart fail
ure, end-stage renal disease.



PLAN:  Would be workup for new onset atrial fibrillation as per cardiology.  Will have dialysis done 
Monday, Wednesday, Friday.  Will follow up.





__________________________________________

Guevara Garcia MD







cc:



DD: 06/26/2017 13:20:25  [id number]

TT: 06/26/2017 17:01:50

Confirmation # [id number]

Dictation # [id number]

dn

## 2017-06-27 RX ADMIN — INSULIN ASPART SCH: 100 INJECTION, SOLUTION INTRAVENOUS; SUBCUTANEOUS at 17:03

## 2017-06-27 RX ADMIN — INSULIN ASPART SCH UNIT: 100 INJECTION, SOLUTION INTRAVENOUS; SUBCUTANEOUS at 13:27

## 2017-06-27 RX ADMIN — INSULIN ASPART SCH: 100 INJECTION, SOLUTION INTRAVENOUS; SUBCUTANEOUS at 21:57

## 2017-06-27 RX ADMIN — INSULIN ASPART SCH: 100 INJECTION, SOLUTION INTRAVENOUS; SUBCUTANEOUS at 07:58

## 2017-06-27 RX ADMIN — PANTOPRAZOLE SODIUM SCH MG: 40 TABLET, DELAYED RELEASE ORAL at 09:47

## 2017-06-27 NOTE — CP.PCM.PN
Subjective





- Date & Time of Evaluation


Date of Evaluation: 06/27/17


Time of Evaluation: 13:00





- Subjective


Subjective: 


clinically same





Objective





- Vital Signs/Intake and Output


Vital Signs (last 24 hours): 


 











Temp Pulse Resp BP Pulse Ox


 


 98.1 F   67   18   163/70 H  95 


 


 06/27/17 13:41  06/27/17 13:41  06/27/17 13:41  06/27/17 13:41  06/27/17 13:41








Intake and Output: 


 











 06/27/17 06/27/17





 06:59 18:59


 


Intake Total 10 


 


Balance 10 














- Medications


Medications: 


 Current Medications





Amlodipine Besylate (Norvasc)  10 mg PO DAILY [facility]


   Last Admin: 06/27/17 10:22 Dose:  10 mg


Apixaban (Eliquis)  2.5 mg PO BID [facility]


   Last Admin: 06/27/17 09:47 Dose:  2.5 mg


Aspirin (Ecotrin)  81 mg PO DAILY [facility]


   Last Admin: 06/27/17 09:47 Dose:  81 mg


Calcium Acetate (Phoslo)  667 mg PO DAILY [facility]


   Last Admin: 06/27/17 09:47 Dose:  667 mg


Carvedilol (Coreg)  3.125 mg PO Q12 [facility]


   Last Admin: 06/27/17 09:29 Dose:  Not Given


Cinacalcet (Sensipar)  30 mg PO DAILY [facility]


   Last Admin: 06/27/17 09:47 Dose:  30 mg


Clonidine HCl (Catapres)  0.3 mg PO BID [facility]


   Last Admin: 06/27/17 09:28 Dose:  Not Given


Insulin Aspart (Novolog)  0 unit SC ACHS [facility]


   PRN Reason: Protocol


   Last Admin: 06/27/17 13:27 Dose:  3 unit


Losartan Potassium (Cozaar)  150 mg PO DAILY [facility]


   Last Admin: 06/27/17 10:22 Dose:  150 mg


Pantoprazole Sodium (Protonix Ec Tab)  40 mg PO DAILY [facility]


   Last Admin: 06/27/17 09:47 Dose:  40 mg


Rosuvastatin Calcium (Crestor)  10 mg PO HS [facility]


   Last Admin: 06/26/17 21:22 Dose:  10 mg











- Labs


Labs: 


 











PT  10.6 SECONDS (9.7-12.2)   06/26/17  10:01    


 


INR  0.9   06/26/17  10:01    


 


APTT  29 SECONDS (21-34)   06/26/17  10:01    














- Constitutional


Appears: Well





- Head Exam


Head Exam: ATRAUMATIC, NORMAL INSPECTION, NORMOCEPHALIC





- Eye Exam


Eye Exam: EOMI, Normal appearance, PERRL


Pupil Exam: NORMAL ACCOMODATION, PERRL





- ENT Exam


ENT Exam: Mucous Membranes Moist, Normal Exam





- Neck Exam


Neck Exam: Full ROM, Normal Inspection.  absent: Lymphadenopathy





- Respiratory Exam


Respiratory Exam: Decreased Breath Sounds





- Cardiovascular Exam


Cardiovascular Exam: REGULAR RHYTHM, +S1, +S2





- GI/Abdominal Exam


GI & Abdominal Exam: Soft, Diminished Bowel Sounds





- Rectal Exam


Rectal Exam: Deferred

## 2017-06-27 NOTE — CARD
--------------- APPROVED REPORT --------------





EKG Measurement

Heart Aitc303LEIS

[id number]

[id number]

[id number]



<Conclusion>

Atrial fibrillation with rapid ventricular response with premature 

ventricular or aberrantly conducted complexes

Cannot rule out Inferior infarct, age undetermined

Abnormal ECG

## 2017-06-27 NOTE — CP.PCM.CON
History of Present Illness





- History of Present Illness


History of Present Illness: 





Consult Note for Dr. Bauman





Reason for Consult: New Onset A-fib





76 y/o F with PMH of CAD s/p stent placement, HTN, DM, ESRD, and CVA presented 

to the hospital on 6/26/17 for chest pain and shortness of breath. The patient 

was undergoing dialysis when she acutely became short of breath and began 

experiencing chest pain. The pain was substernal and intense. The patient 

denied any radiation of pain. Pt immediately stopped dialysis and was brought 

to the hospital. Pt states she had a similar episode 2 weeks ago in which she 

was brought to the hospital and stayed for several days. On admission, EKG 

showed A-fib with RVR. This morning, patient is no longer in A-fib on the 

monitor. Pt states she feels fine with no complaints. Denies CP, SOB, N/V/D.





PMH: CAD s/p stent placement, HTN, DM, ESRD, and CVA


PSH: AV fistula, AV graft, coronary stent, cholecystectomy


FMH: DM


Social Hx: Denies alcohol, tobacco, or illicit drug use


Allergies: NKDA


Medications: See MAR








Review of Systems





- Constitutional


Constitutional: Fatigue.  absent: Fever





- EENT


Eyes: absent: Blurred Vision, Change in Vision





- Cardiovascular


Cardiovascular: Chest Pain, Irregular Heart Rhythm





- Respiratory


Respiratory: Dyspnea.  absent: Cough





- Gastrointestinal


Gastrointestinal: absent: Abdominal Pain, Diarrhea, Vomiting





- Genitourinary


Genitourinary: absent: Dysuria, Hematuria





- Integumentary


Integumentary: absent: New Lesions, Rash





- Neurological


Neurological: absent: Numbness, Syncope, Tingling





Past Patient History





- Infectious Disease


Hx of Infectious Diseases: None





- Tetanus Immunizations


Tetanus Immunization: Unknown





- Past Medical History & Family History


Past Medical History?: Yes





- Past Social History


Smoking Status: Never Smoked





- CARDIAC


Hx Cardiac Disorders: Yes


Hx Hypertension: Yes





- PULMONARY


Hx Respiratory Disorders: Yes


Hx Asthma: Yes





- NEUROLOGICAL


Hx Neurological Disorder: No





- HEENT


Hx HEENT Problems: Yes


Other/Comment: wear eyeglasses





- RENAL


Hx Chronic Kidney Disease: Yes


Date of Last Dialysis Treatment: 06/14/17


Hx Kidney Stones: No





- ENDOCRINE/METABOLIC


Hx Endocrine Disorders: Yes


Hx Hypothyroidism: Yes





- HEMATOLOGICAL/ONCOLOGICAL


Hx Blood Disorders: Yes


Hx Anemia: Yes





- INTEGUMENTARY


Hx Dermatological Problems: No





- MUSCULOSKELETAL/RHEUMATOLOGICAL


Hx Musculoskeletal Disorders: No


Hx Falls: No





- GASTROINTESTINAL


Hx Gastrointestinal Disorders: No





- GENITOURINARY/GYNECOLOGICAL


Hx Genitourinary Disorders: No





- PSYCHIATRIC


Hx Psychophysiologic Disorder: No


Hx Substance Use: No





- SURGICAL HISTORY


Hx Surgeries: Yes


Hx Coronary Stent: Yes





- ANESTHESIA


Hx Anesthesia: Yes


Hx Anesthesia Reactions: No


Hx Malignant Hyperthermia: No





Meds


Allergies/Adverse Reactions: 


 Allergies











Allergy/AdvReac Type Severity Reaction Status Date / Time


 


No Known Allergies Allergy   Verified 06/26/17 09:13














- Medications


Medications: 


 Current Medications





Amlodipine Besylate (Norvasc)  10 mg PO DAILY [facility]


   Last Admin: 06/27/17 10:22 Dose:  10 mg


Apixaban (Eliquis)  2.5 mg PO BID [facility]


   Last Admin: 06/27/17 09:47 Dose:  2.5 mg


Aspirin (Ecotrin)  81 mg PO DAILY [facility]


   Last Admin: 06/27/17 09:47 Dose:  81 mg


Calcium Acetate (Phoslo)  667 mg PO DAILY [facility]


   Last Admin: 06/27/17 09:47 Dose:  667 mg


Carvedilol (Coreg)  3.125 mg PO Q12 [facility]


   Last Admin: 06/27/17 09:29 Dose:  Not Given


Cinacalcet (Sensipar)  30 mg PO DAILY [facility]


   Last Admin: 06/27/17 09:47 Dose:  30 mg


Clonidine HCl (Catapres)  0.3 mg PO BID [facility]


   Last Admin: 06/27/17 09:28 Dose:  Not Given


Insulin Aspart (Novolog)  0 unit SC ACHS [facility]


   PRN Reason: Protocol


   Last Admin: 06/27/17 07:58 Dose:  Not Given


Losartan Potassium (Cozaar)  150 mg PO DAILY [facility]


   Last Admin: 06/27/17 10:22 Dose:  150 mg


Pantoprazole Sodium (Protonix Ec Tab)  40 mg PO DAILY [facility]


   Last Admin: 06/27/17 09:47 Dose:  40 mg


Rosuvastatin Calcium (Crestor)  10 mg PO HS [facility]


   Last Admin: 06/26/17 21:22 Dose:  10 mg











Physical Exam





- Constitutional


Appears: Well, No Acute Distress





- Head Exam


Head Exam: ATRAUMATIC, NORMAL INSPECTION, NORMOCEPHALIC





- ENT Exam


ENT Exam: Mucous Membranes Moist





- Respiratory Exam


Respiratory Exam: Clear to Auscultation Bilateral, NORMAL BREATHING PATTERN





- Cardiovascular Exam


Cardiovascular Exam: RRR, +S1, +S2





- GI/Abdominal Exam


GI & Abdominal Exam: Normal Bowel Sounds, Soft.  absent: Tenderness





- Extremities Exam


Extremities exam: Positive for: normal inspection.  Negative for: calf 

tenderness, pedal edema





- Neurological Exam


Neurological exam: Alert, Oriented x3





- Skin


Skin Exam: Intact, Normal Color, Warm





Results





- Vital Signs


Recent Vital Signs: 


 Last Vital Signs











Temp  97.7 F   06/27/17 07:20


 


Pulse  60   06/27/17 10:21


 


Resp  18   06/27/17 07:20


 


BP  145/70   06/27/17 10:21


 


Pulse Ox  97   06/27/17 07:20














- Labs


Result Diagrams: 


 06/28/17 07:16





 06/28/17 07:16


Labs: 


 Laboratory Results - last 24 hr











  06/26/17 06/26/17 06/27/17





  17:28 21:14 06:38


 


POC Glucose (mg/dL)  169 H  158 H  106














Assessment & Plan


(1) New onset atrial fibrillation


Assessment and Plan: 


Pt now in sinus rhythm


No further intervention needed at this time


Pt is clear for discharge from cardiology perspective


Status: Acute

## 2017-06-27 NOTE — CP.PCM.PN
Subjective





- Date & Time of Evaluation


Date of Evaluation: 06/27/17


Time of Evaluation: 10:52





- Subjective


Subjective: 





seen and examined


feels well, 


new onset a fib, rate controlled





Objective





- Vital Signs/Intake and Output


Vital Signs (last 24 hours): 


 











Temp Pulse Resp BP Pulse Ox


 


 97.7 F   60   18   145/70   97 


 


 06/27/17 07:20  06/27/17 10:21  06/27/17 07:20  06/27/17 10:21  06/27/17 07:20








Intake and Output: 


 











 06/27/17 06/27/17





 06:59 18:59


 


Intake Total 10 


 


Balance 10 














- Medications


Medications: 


 Current Medications





Amlodipine Besylate (Norvasc)  10 mg PO DAILY [facility]


   Last Admin: 06/27/17 10:22 Dose:  10 mg


Apixaban (Eliquis)  2.5 mg PO BID [facility]


   Last Admin: 06/27/17 09:47 Dose:  2.5 mg


Aspirin (Ecotrin)  81 mg PO DAILY [facility]


   Last Admin: 06/27/17 09:47 Dose:  81 mg


Calcium Acetate (Phoslo)  667 mg PO DAILY [facility]


   Last Admin: 06/27/17 09:47 Dose:  667 mg


Carvedilol (Coreg)  3.125 mg PO Q12 [facility]


   Last Admin: 06/27/17 09:29 Dose:  Not Given


Cinacalcet (Sensipar)  30 mg PO DAILY [facility]


   Last Admin: 06/27/17 09:47 Dose:  30 mg


Clonidine HCl (Catapres)  0.3 mg PO BID [facility]


   Last Admin: 06/27/17 09:28 Dose:  Not Given


[facility] (Nov[facility])  [facility]


   PRN Reason: Protocol


   Last Admin: 06/27/17 07:58 Dose:  Not Given


Losartan Potassium (Cozaar)  150 mg PO DAILY [facility]


   Last Admin: 06/27/17 10:22 Dose:  150 mg


Pantoprazole Sodium (Protonix Ec Tab)  40 mg PO DAILY [facility]


   Last Admin: 06/27/17 09:47 Dose:  40 mg


Rosuvastatin Calcium (Crestor)  10 mg PO HS [facility]


   Last Admin: 06/26/17 21:22 Dose:  10 mg











- Labs


Labs: 


 











PT  10.6 SECONDS (9.7-12.2)   06/26/17  10:01    


 


INR  0.9   06/26/17  10:01    


 


APTT  29 SECONDS (21-34)   06/26/17  10:01    














- Constitutional


Appears: Non-toxic, No Acute Distress, Chronically Ill





- Head Exam


Head Exam: NORMAL INSPECTION





- Eye Exam


Eye Exam: Normal appearance





- ENT Exam


ENT Exam: Mucous Membranes Moist, Normal Exam





- Neck Exam


Neck Exam: Normal Inspection





- Respiratory Exam


Respiratory Exam: Clear to Ausculation Bilateral





- Cardiovascular Exam


Cardiovascular Exam: REGULAR RHYTHM, RRR





- GI/Abdominal Exam


GI & Abdominal Exam: Distended, Soft, Normal Bowel Sounds





- Extremities Exam


Extremities Exam: Normal Inspection





Assessment and Plan


(1) New onset atrial fibrillation


Status: Acute   





(2) Anemia of renal disease


Status: Acute   





(3) CAD (coronary artery disease)


Status: Acute   





(4) ESRD (end stage renal disease) on dialysis


Status: Acute   





(5) Hypertensive urgency


Status: Acute   





- Assessment and Plan (Free Text)


Assessment: 





hd tomorrow and mwf


bp improved


cardiac work up

## 2017-06-28 VITALS — RESPIRATION RATE: 20 BRPM

## 2017-06-28 LAB
ALBUMIN/GLOB SERPL: 1 {RATIO} (ref 1–2.1)
ALP SERPL-CCNC: 143 U/L (ref 38–126)
ALT SERPL-CCNC: 13 U/L (ref 9–52)
AST SERPL-CCNC: 30 U/L (ref 14–36)
BASOPHILS # BLD AUTO: 0.1 K/UL (ref 0–0.2)
BASOPHILS NFR BLD: 1.1 % (ref 0–2)
BILIRUB SERPL-MCNC: 0.6 MG/DL (ref 0.2–1.3)
BUN SERPL-MCNC: 67 MG/DL (ref 7–17)
CALCIUM SERPL-MCNC: 8.4 MG/DL (ref 8.6–10.4)
CHLORIDE SERPL-SCNC: 85 MMOL/L (ref 98–107)
CO2 SERPL-SCNC: 29 MMOL/L (ref 22–30)
EOSINOPHIL # BLD AUTO: 0.6 K/UL (ref 0–0.7)
EOSINOPHIL NFR BLD: 8.5 % (ref 0–4)
ERYTHROCYTE [DISTWIDTH] IN BLOOD BY AUTOMATED COUNT: 16.7 % (ref 11.5–14.5)
GLOBULIN SER-MCNC: 3.4 GM/DL (ref 2.2–3.9)
GLUCOSE SERPL-MCNC: 123 MG/DL (ref 65–105)
HCT VFR BLD CALC: 33.1 % (ref 34–47)
LYMPHOCYTES # BLD AUTO: 1.5 K/UL (ref 1–4.3)
LYMPHOCYTES NFR BLD AUTO: 21.3 % (ref 20–40)
MCH RBC QN AUTO: 25.9 PG (ref 27–31)
MCHC RBC AUTO-ENTMCNC: 32.2 G/DL (ref 33–37)
MCV RBC AUTO: 80.4 FL (ref 81–99)
MONOCYTES # BLD: 0.7 K/UL (ref 0–0.8)
MONOCYTES NFR BLD: 9.8 % (ref 0–10)
NRBC BLD AUTO-RTO: 0.1 % (ref 0–2)
PLATELET # BLD: 241 K/UL (ref 130–400)
PMV BLD AUTO: 9 FL (ref 7.2–11.7)
POTASSIUM SERPL-SCNC: 5.3 MMOL/L (ref 3.6–5.2)
PROT SERPL-MCNC: 6.9 G/DL (ref 6.3–8.3)
SODIUM SERPL-SCNC: 128 MMOL/L (ref 132–148)
WBC # BLD AUTO: 7.1 K/UL (ref 4.8–10.8)

## 2017-06-28 RX ADMIN — INSULIN ASPART SCH UNIT: 100 INJECTION, SOLUTION INTRAVENOUS; SUBCUTANEOUS at 17:16

## 2017-06-28 RX ADMIN — INSULIN ASPART SCH: 100 INJECTION, SOLUTION INTRAVENOUS; SUBCUTANEOUS at 07:51

## 2017-06-28 RX ADMIN — INSULIN ASPART SCH: 100 INJECTION, SOLUTION INTRAVENOUS; SUBCUTANEOUS at 22:00

## 2017-06-28 RX ADMIN — PANTOPRAZOLE SODIUM SCH MG: 40 TABLET, DELAYED RELEASE ORAL at 14:19

## 2017-06-28 RX ADMIN — INSULIN ASPART SCH UNIT: 100 INJECTION, SOLUTION INTRAVENOUS; SUBCUTANEOUS at 13:51

## 2017-06-28 NOTE — CP.PCM.PN
Subjective





- Date & Time of Evaluation


Date of Evaluation: 06/28/17


Time of Evaluation: 12:00





- Subjective


Subjective: 


clinically same





Objective





- Vital Signs/Intake and Output


Vital Signs (last 24 hours): 


 











Temp Pulse Resp BP Pulse Ox


 


 98 F   68   20   132/65   100 


 


 06/28/17 15:25  06/28/17 15:25  06/28/17 15:25  06/28/17 15:25  06/28/17 15:25








Intake and Output: 


 











 06/28/17 06/28/17





 06:59 18:59


 


Intake Total  240


 


Balance  240














- Medications


Medications: 


 Current Medications





Amlodipine Besylate (Norvasc)  10 mg PO DAILY [facility]


   Last Admin: 06/28/17 14:19 Dose:  10 mg


Apixaban (Eliquis)  2.5 mg PO BID [facility]


   Last Admin: 06/28/17 17:16 Dose:  2.5 mg


Aspirin (Ecotrin)  81 mg PO DAILY [facility]


   Last Admin: 06/28/17 14:17 Dose:  81 mg


Calcium Acetate (Phoslo)  667 mg PO TIDCC [facility]


   Last Admin: 06/28/17 17:16 Dose:  667 mg


Carvedilol (Coreg)  6.25 mg PO BID [facility]


   Last Admin: 06/28/17 17:16 Dose:  6.25 mg


Cinacalcet (Sensipar)  30 mg PO DAILY [facility]


   Last Admin: 06/28/17 14:18 Dose:  30 mg


Clonidine HCl (Catapres)  0.3 mg PO BID [facility]


   Last Admin: 06/28/17 17:16 Dose:  0.3 mg


Insulin Aspart (Novolog)  0 unit SC ACHS [facility]


   PRN Reason: Protocol


   Last Admin: 06/28/17 17:16 Dose:  3 unit


Losartan Potassium (Cozaar)  150 mg PO DAILY [facility]


   Last Admin: 06/28/17 14:18 Dose:  150 mg


Pantoprazole Sodium (Protonix Ec Tab)  40 mg PO DAILY [facility]


   Last Admin: 06/28/17 14:19 Dose:  40 mg


Rosuvastatin Calcium (Crestor)  10 mg PO HS [facility]


   Last Admin: 06/27/17 21:29 Dose:  10 mg











- Labs


Labs: 


 





 06/28/17 07:16 





 06/28/17 07:16 





 











PT  10.6 SECONDS (9.7-12.2)   06/26/17  10:01    


 


INR  0.9   06/26/17  10:01    


 


APTT  29 SECONDS (21-34)   06/26/17  10:01    














- Constitutional


Appears: Well





- Head Exam


Head Exam: ATRAUMATIC, NORMAL INSPECTION, NORMOCEPHALIC





- Eye Exam


Eye Exam: EOMI, Normal appearance, PERRL


Pupil Exam: NORMAL ACCOMODATION, PERRL





- ENT Exam


ENT Exam: Mucous Membranes Moist, Normal Exam





- Neck Exam


Neck Exam: Full ROM, Normal Inspection.  absent: Lymphadenopathy





- Respiratory Exam


Respiratory Exam: Decreased Breath Sounds





- Cardiovascular Exam


Cardiovascular Exam: REGULAR RHYTHM, +S1, +S2





- GI/Abdominal Exam


GI & Abdominal Exam: Soft, Diminished Bowel Sounds





- Rectal Exam


Rectal Exam: Deferred

## 2017-06-28 NOTE — CP.PCM.PN
Subjective





- Date & Time of Evaluation


Date of Evaluation: 06/28/17


Time of Evaluation: 14:31





- Subjective


Subjective: 





Had run of AFib again


Stable dialysis today; UF 2000ml


HTN still elevated


not dyspneic





Objective





- Vital Signs/Intake and Output


Vital Signs (last 24 hours): 


 











Temp Pulse Resp BP Pulse Ox


 


 98.6 F   68   18   181/75 H  99 


 


 06/28/17 13:50  06/28/17 13:50  06/28/17 13:50  06/28/17 13:50  06/28/17 13:50











- Medications


Medications: 


 Current Medications





Amlodipine Besylate (Norvasc)  10 mg PO DAILY [facility]


   Last Admin: 06/28/17 14:19 Dose:  10 mg


Apixaban (Eliquis)  2.5 mg PO BID [facility]


   Last Admin: 06/28/17 14:17 Dose:  2.5 mg


Aspirin (Ecotrin)  81 mg PO DAILY [facility]


   Last Admin: 06/28/17 14:17 Dose:  81 mg


Calcium Acetate (Phoslo)  667 mg PO DAILY [facility]


   Last Admin: 06/28/17 14:18 Dose:  667 mg


Carvedilol (Coreg)  3.125 mg PO DAILY [facility]


   Last Admin: 06/28/17 14:19 Dose:  3.125 mg


Cinacalcet (Sensipar)  30 mg PO DAILY [facility]


   Last Admin: 06/28/17 14:18 Dose:  30 mg


Clonidine HCl (Catapres)  0.3 mg PO BID [facility]


   Last Admin: 06/28/17 11:00 Dose:  Not Given


Insulin Aspart (Novolog)  0 unit SC ACHS [facility]


   PRN Reason: Protocol


   Last Admin: 06/28/17 13:51 Dose:  1 unit


Losartan Potassium (Cozaar)  150 mg PO DAILY [facility]


   Last Admin: 06/28/17 14:18 Dose:  150 mg


Pantoprazole Sodium (Protonix Ec Tab)  40 mg PO DAILY [facility]


   Last Admin: 06/28/17 14:19 Dose:  40 mg


Rosuvastatin Calcium (Crestor)  10 mg PO HS [facility]


   Last Admin: 06/27/17 21:29 Dose:  10 mg











- Labs


Labs: 


 





 06/28/17 07:16 





 06/28/17 07:16 





 











PT  10.6 SECONDS (9.7-12.2)   06/26/17  10:01    


 


INR  0.9   06/26/17  10:01    


 


APTT  29 SECONDS (21-34)   06/26/17  10:01    














- Constitutional


Appears: No Acute Distress, Chronically Ill





- Head Exam


Head Exam: ATRAUMATIC, NORMAL INSPECTION





- Eye Exam


Eye Exam: EOMI, Normal appearance





- Respiratory Exam


Respiratory Exam: Clear to Ausculation Bilateral, NORMAL BREATHING PATTERN





- Cardiovascular Exam


Cardiovascular Exam: REGULAR RHYTHM, +S1





- GI/Abdominal Exam


GI & Abdominal Exam: Soft.  absent: Tenderness





- Extremities Exam


Extremities Exam: Normal Inspection.  absent: Tenderness





- Neurological Exam


Neurological Exam: Alert, CN II-XII Intact





- Skin


Skin Exam: Dry, Warm





Assessment and Plan


(1) New onset atrial fibrillation


Status: Acute   





(2) CAD (coronary artery disease)


Status: Acute   





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


Status: Acute   





(4) ESRD (end stage renal disease) on dialysis


Status: Acute   





- Assessment and Plan (Free Text)


Plan: 





Increase carvedilol dose


Monitor AFib

## 2017-06-28 NOTE — CP.PCM.PN
Subjective





- Date & Time of Evaluation


Date of Evaluation: 06/28/17


Time of Evaluation: 15:05





- Subjective


Subjective: 





Progress Note for Dr. Bauman





Pt seen and examined at bedside. Pt doing well overnight with no acute events 

as per nursing. Pt states she has no complaints this morning. Pt did become 

bradycardic in the 40's at one point, but quickly came up to a normal rate. 

Denies CP, SOB, N/V/D.





Objective





- Vital Signs/Intake and Output


Vital Signs (last 24 hours): 


 











Temp Pulse Resp BP Pulse Ox


 


 98.6 F   68   18   181/75 H  99 


 


 06/28/17 13:50  06/28/17 13:50  06/28/17 13:50  06/28/17 13:50  06/28/17 13:50











- Medications


Medications: 


 Current Medications





Amlodipine Besylate (Norvasc)  10 mg PO DAILY [facility]


   Last Admin: 06/28/17 14:19 Dose:  10 mg


Apixaban (Eliquis)  2.5 mg PO BID [facility]


   Last Admin: 06/28/17 14:17 Dose:  2.5 mg


Aspirin (Ecotrin)  81 mg PO DAILY [facility]


   Last Admin: 06/28/17 14:17 Dose:  81 mg


Calcium Acetate (Phoslo)  667 mg PO TIDCC [facility]


Carvedilol (Coreg)  6.25 mg PO BID [facility]


Cinacalcet (Sensipar)  30 mg PO DAILY [facility]


   Last Admin: 06/28/17 14:18 Dose:  30 mg


Clonidine HCl (Catapres)  0.3 mg PO BID [facility]


   Last Admin: 06/28/17 11:00 Dose:  Not Given


Insulin Aspart (Novolog)  0 unit SC ACHS [facility]


   PRN Reason: Protocol


   Last Admin: 06/28/17 13:51 Dose:  1 unit


Losartan Potassium (Cozaar)  150 mg PO DAILY [facility]


   Last Admin: 06/28/17 14:18 Dose:  150 mg


Pantoprazole Sodium (Protonix Ec Tab)  40 mg PO DAILY [facility]


   Last Admin: 06/28/17 14:19 Dose:  40 mg


Rosuvastatin Calcium (Crestor)  10 mg PO HS [facility]


   Last Admin: 06/27/17 21:29 Dose:  10 mg











- Labs


Labs: 


 





 06/28/17 07:16 





 06/28/17 07:16 





 











PT  10.6 SECONDS (9.7-12.2)   06/26/17  10:01    


 


INR  0.9   06/26/17  10:01    


 


APTT  29 SECONDS (21-34)   06/26/17  10:01    














- Constitutional


Appears: Well, No Acute Distress





- Head Exam


Head Exam: ATRAUMATIC, NORMAL INSPECTION, NORMOCEPHALIC





- ENT Exam


ENT Exam: Mucous Membranes Moist





- Respiratory Exam


Respiratory Exam: Clear to Ausculation Bilateral, NORMAL BREATHING PATTERN





- Cardiovascular Exam


Cardiovascular Exam: RRR, +S1, +S2





- GI/Abdominal Exam


GI & Abdominal Exam: Soft, Normal Bowel Sounds.  absent: Tenderness





- Extremities Exam


Extremities Exam: absent: Calf Tenderness, Pedal Edema





- Neurological Exam


Neurological Exam: Alert, Awake, Oriented x3





- Skin


Skin Exam: Intact, Normal Color, Warm





Assessment and Plan


(1) New onset atrial fibrillation


Assessment & Plan: 


NSR on monitor


Patient clear for discharge


Continue home medication regimen


Status: Acute

## 2017-06-29 VITALS
DIASTOLIC BLOOD PRESSURE: 69 MMHG | SYSTOLIC BLOOD PRESSURE: 137 MMHG | OXYGEN SATURATION: 98 % | HEART RATE: 56 BPM | TEMPERATURE: 97.6 F

## 2017-06-29 RX ADMIN — PANTOPRAZOLE SODIUM SCH MG: 40 TABLET, DELAYED RELEASE ORAL at 11:16

## 2017-06-29 RX ADMIN — INSULIN ASPART SCH UNIT: 100 INJECTION, SOLUTION INTRAVENOUS; SUBCUTANEOUS at 12:57

## 2017-06-29 RX ADMIN — INSULIN ASPART SCH: 100 INJECTION, SOLUTION INTRAVENOUS; SUBCUTANEOUS at 08:12

## 2017-06-29 NOTE — CP.PCM.PN
Subjective





- Date & Time of Evaluation


Date of Evaluation: 06/29/17


Time of Evaluation: 10:40





- Subjective


Subjective: 


clinically same





Objective





- Vital Signs/Intake and Output


Vital Signs (last 24 hours): 


 











Temp Pulse Resp BP Pulse Ox


 


 98.2 F   73   20   169/75 H  96 


 


 06/28/17 23:15  06/29/17 06:16  06/28/17 23:15  06/29/17 06:16  06/28/17 23:15








Intake and Output: 


 











 06/29/17 06/29/17





 06:59 18:59


 


Intake Total 100 


 


Balance 100 














- Medications


Medications: 


 Current Medications





Amlodipine Besylate (Norvasc)  10 mg PO DAILY [facility]


   Last Admin: 06/28/17 14:19 Dose:  10 mg


Apixaban (Eliquis)  2.5 mg PO BID [facility]


   Last Admin: 06/28/17 17:16 Dose:  2.5 mg


Aspirin (Ecotrin)  81 mg PO DAILY [facility]


   Last Admin: 06/28/17 14:17 Dose:  81 mg


Calcium Acetate (Phoslo)  667 mg PO TIDCC [facility]


   Last Admin: 06/29/17 08:23 Dose:  667 mg


Carvedilol (Coreg)  6.25 mg PO BID [facility]


   Last Admin: 06/28/17 17:16 Dose:  6.25 mg


Cinacalcet (Sensipar)  30 mg PO DAILY [facility]


   Last Admin: 06/28/17 14:18 Dose:  30 mg


Clonidine HCl (Catapres)  0.3 mg PO BID [facility]


   Last Admin: 06/28/17 17:16 Dose:  0.3 mg


Insulin Aspart (Novolog)  0 unit SC [facility]


   PRN Reason: Protocol


   Last Admin: 06/29/17 08:12 Dose:  Not Given


Losartan Potassium (Cozaar)  150 mg PO DAILY [facility]


   Last Admin: 06/28/17 14:18 Dose:  150 mg


Pantoprazole Sodium (Protonix Ec Tab)  40 mg PO DAILY [facility]


   Last Admin: 06/28/17 14:19 Dose:  40 mg


Rosuvastatin Calcium (Crestor)  10 mg PO HS [facility]


   Last Admin: 06/28/17 21:31 Dose:  10 mg











- Labs


Labs: 


 





 06/28/17 07:16 





 06/28/17 07:16 





 











PT  10.6 SECONDS (9.7-12.2)   06/26/17  10:01    


 


INR  0.9   06/26/17  10:01    


 


APTT  29 SECONDS (21-34)   06/26/17  10:01    














- Constitutional


Appears: Well





- Head Exam


Head Exam: ATRAUMATIC, NORMAL INSPECTION, NORMOCEPHALIC





- Eye Exam


Eye Exam: EOMI, Normal appearance, PERRL


Pupil Exam: NORMAL ACCOMODATION, PERRL





- ENT Exam


ENT Exam: Mucous Membranes Moist, Normal Exam





- Neck Exam


Neck Exam: Full ROM, Normal Inspection.  absent: Lymphadenopathy





- Respiratory Exam


Respiratory Exam: Decreased Breath Sounds





- Cardiovascular Exam


Cardiovascular Exam: REGULAR RHYTHM, +S1, +S2





- GI/Abdominal Exam


GI & Abdominal Exam: Soft, Diminished Bowel Sounds





- Rectal Exam


Rectal Exam: Deferred

## 2017-06-29 NOTE — CP.PCM.PN
Subjective





- Date & Time of Evaluation


Date of Evaluation: 06/29/17


Time of Evaluation: 14:46





- Subjective


Subjective: 





feels well no complaints


palpitations resolved





Objective





- Vital Signs/Intake and Output


Vital Signs (last 24 hours): 


 











Temp Pulse Resp BP Pulse Ox


 


 97.6 F   56 L  20   137/69   98 


 


 06/29/17 13:54  06/29/17 13:54  06/29/17 13:54  06/29/17 13:54  06/29/17 13:54








Intake and Output: 


 











 06/29/17 06/29/17





 06:59 18:59


 


Intake Total 100 


 


Balance 100 














- Medications


Medications: 


 Current Medications





Amlodipine Besylate (Norvasc)  10 mg PO DAILY [facility]


   Last Admin: 06/29/17 11:16 Dose:  10 mg


Apixaban (Eliquis)  2.5 mg PO BID [facility]


   Last Admin: 06/29/17 11:17 Dose:  2.5 mg


Aspirin (Ecotrin)  81 mg PO DAILY [facility]


   Last Admin: 06/29/17 11:16 Dose:  81 mg


Calcium Acetate (Phoslo)  667 mg PO [facility]CC [facility]


   Last Admin: 06/29/17 12:58 Dose:  667 mg


Carvedilol (Coreg)  6.25 mg PO BID [facility]


   Last Admin: 06/29/17 11:16 Dose:  6.25 mg


Cinacalcet (Sensipar)  30 mg PO DAILY [facility]


   Last Admin: 06/29/17 11:16 Dose:  30 mg


Clonidine HCl (Catapres)  0.3 mg PO BID [facility]


   Last Admin: 06/29/17 11:21 Dose:  0.3 mg


Insulin Aspart (Novolog)  0 unit SC [facility]


   PRN Reason: Protocol


   Last Admin: 06/29/17 12:57 Dose:  1 unit


Losartan Potassium (Cozaar)  150 mg PO DAILY [facility]


   Last Admin: 06/29/17 11:16 Dose:  150 mg


Pantoprazole Sodium (Protonix Ec Tab)  40 mg PO DAILY [facility]


   Last Admin: 06/29/17 11:16 Dose:  40 mg


Rosuvastatin Calcium (Crestor)  10 mg PO HS [facility]


   Last Admin: 06/28/17 21:31 Dose:  10 mg











- Labs


Labs: 


 





 06/28/17 07:16 





 06/28/17 07:16 





 











PT  10.6 SECONDS (9.7-12.2)   06/26/17  10:01    


 


INR  0.9   06/26/17  10:01    


 


APTT  29 SECONDS (21-34)   06/26/17  10:01    














- Constitutional


Appears: Non-toxic, No Acute Distress





- Head Exam


Head Exam: NORMAL INSPECTION





- Eye Exam


Eye Exam: Normal appearance





- ENT Exam


ENT Exam: Mucous Membranes Moist, Normal Exam





- Neck Exam


Neck Exam: Normal Inspection





- Respiratory Exam


Respiratory Exam: Clear to Ausculation Bilateral, NORMAL BREATHING PATTERN





- Cardiovascular Exam


Cardiovascular Exam: RRR





- GI/Abdominal Exam


GI & Abdominal Exam: Distended, Soft





- Extremities Exam


Extremities Exam: Normal Inspection





- Neurological Exam


Neurological Exam: Alert, Oriented x3





- Psychiatric Exam


Psychiatric exam: Normal Affect





Assessment and Plan


(1) New onset atrial fibrillation


Status: Acute   





(2) Anemia of renal disease


Status: Acute   





(3) CAD (coronary artery disease)


Status: Acute   





(4) ESRD (end stage renal disease) on dialysis


Status: Acute   





(5) Hypertensive urgency


Status: Acute   





- Assessment and Plan (Free Text)


Assessment: 





maintain hd mwf


stable from renal standpoint

## 2017-06-29 NOTE — CP.PCM.PN
Subjective





- Date & Time of Evaluation


Date of Evaluation: 06/29/17


Time of Evaluation: 09:21





- Subjective


Subjective: 





PGY2 Medicine Note- Dr. Levin's service:





Patient seen and examined.  Patient denies palpitations.  Patient states she 

feels well, ready for DC.





Objective





- Vital Signs/Intake and Output


Vital Signs (last 24 hours): 


 











Temp Pulse Resp BP Pulse Ox


 


 98.2 F   73   20   169/75 H  96 


 


 06/28/17 23:15  06/29/17 06:16  06/28/17 23:15  06/29/17 06:16  06/28/17 23:15








Intake and Output: 


 











 06/29/17 06/29/17





 06:59 18:59


 


Intake Total 100 


 


Balance 100 














- Medications


Medications: 


 Current Medications





Amlodipine Besylate (Norvasc)  10 mg PO DAILY [facility]


   Last Admin: 06/28/17 14:19 Dose:  10 mg


Apixaban (Eliquis)  2.5 mg PO BID [facility]


   Last Admin: 06/28/17 17:16 Dose:  2.5 mg


Aspirin (Ecotrin)  81 mg PO DAILY [facility]


   Last Admin: 06/28/17 14:17 Dose:  81 mg


Calcium Acetate (Phoslo)  667 mg PO [facility]


   Last Admin: 06/29/17 08:23 Dose:  667 mg


Carvedilol (Coreg)  6.25 mg PO BID [facility]


   Last Admin: 06/28/17 17:16 Dose:  6.25 mg


Cinacalcet (Sensipar)  30 mg PO DAILY [facility]


   Last Admin: 06/28/17 14:18 Dose:  30 mg


Clonidine HCl (Catapres)  0.3 mg PO BID [facility]


   Last Admin: 06/28/17 17:16 Dose:  0.3 mg


Ins[facility])  [facility]


   PRN Reason: Protocol


   Last Admin: 06/29/17 08:12 Dose:  Not Given


Losartan Potassium (Cozaar)  150 mg PO DAILY [facility]


   Last Admin: 06/28/17 14:18 Dose:  150 mg


Pantoprazole Sodium (Protonix Ec Tab)  40 mg PO DAILY [facility]


   Last Admin: 06/28/17 14:19 Dose:  40 mg


Rosuvastatin Calcium (Crestor)  10 mg PO HS [facility]


   Last Admin: 06/28/17 21:31 Dose:  10 mg











- Labs


Labs: 


 





 06/28/17 07:16 





 06/28/17 07:16 





 











PT  10.6 SECONDS (9.7-12.2)   06/26/17  10:01    


 


INR  0.9   06/26/17  10:01    


 


APTT  29 SECONDS (21-34)   06/26/17  10:01    














- Constitutional


Appears: Non-toxic, No Acute Distress





- Head Exam


Head Exam: ATRAUMATIC, NORMOCEPHALIC





- Eye Exam


Eye Exam: EOMI





- ENT Exam


ENT Exam: Mucous Membranes Moist





- Respiratory Exam


Respiratory Exam: Clear to Ausculation Bilateral, NORMAL BREATHING PATTERN





- Cardiovascular Exam


Cardiovascular Exam: +S1, +S2





- GI/Abdominal Exam


GI & Abdominal Exam: Soft, Normal Bowel Sounds.  absent: Tenderness





- Extremities Exam


Extremities Exam: Normal Inspection.  absent: Pedal Edema


Additional comments: 





AVF with palpable thrill





- Neurological Exam


Neurological Exam: Alert, Awake





- Psychiatric Exam


Psychiatric exam: Normal Affect





- Skin


Skin Exam: Warm





Assessment and Plan





- Assessment and Plan (Free Text)


Assessment: 





(1) New onset atrial fibrillation


Assessment & Plan: 


NSR on monitor


Patient clear for discharge per cardiology


Continue home medication regimen: coreg 6.25mg BID


continue eliquis 2.5mg PO BID, ASA 81mg daily





(2) HTN


Assessment & Plan: 


continue clonidine 0.3mg PO BID, cozaar 150mg daily, norvasc 10mg





(3) ESRD on HD


Assessment & Plan:


continue dialysis MWF


nephrology, Dr. Garcia on board- help appreciated


continue sensipar 30mg PO daily, phoslo 667mg PO TIDCC





(4) HLD


Assessment & Plan:


continue crestor 10mg PO HS





(5) Diabetes


Assessment & Plan:


continue ISS, monitor





(6) Prophylaxis


Assessment & Plan:


continue protonix 40mg daily





All medical management per Dr. Levin








Patient is stable for discharge home per Dr. Levin.  Patient is to follow up 

wither Dr. Levin and her PMD Dr. Tatiana Adams.  Patient is to stop taking 

coreg 3.125mg twice a day and instead take 6.25mg twice a day.  Patient is also 

being given Eliquis 2.5mg BID.  Patient is to resume other home medications. 

Patient is to resume home dialysis schedule and follow up with her 

nephrologist.  This was explained to the patient who understands and agrees.

## 2017-09-06 ENCOUNTER — HOSPITAL ENCOUNTER (EMERGENCY)
Dept: HOSPITAL 31 - C.ER | Age: 77
Discharge: HOME | End: 2017-09-06
Payer: MEDICARE

## 2017-09-06 VITALS
SYSTOLIC BLOOD PRESSURE: 170 MMHG | DIASTOLIC BLOOD PRESSURE: 65 MMHG | TEMPERATURE: 97.9 F | HEART RATE: 75 BPM | RESPIRATION RATE: 18 BRPM

## 2017-09-06 VITALS
HEART RATE: 79 BPM | RESPIRATION RATE: 18 BRPM | DIASTOLIC BLOOD PRESSURE: 74 MMHG | TEMPERATURE: 97.5 F | SYSTOLIC BLOOD PRESSURE: 129 MMHG

## 2017-09-06 VITALS — OXYGEN SATURATION: 98 %

## 2017-09-06 VITALS — OXYGEN SATURATION: 96 %

## 2017-09-06 VITALS — BODY MASS INDEX: 25 KG/M2
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
## 2017-09-06 DIAGNOSIS — Z99.2: ICD-10-CM

## 2017-09-06 DIAGNOSIS — N39.0: Primary | ICD-10-CM

## 2017-09-06 DIAGNOSIS — N18.6: ICD-10-CM

## 2017-09-06 DIAGNOSIS — M25.531: ICD-10-CM

## 2017-09-06 DIAGNOSIS — I12.0: ICD-10-CM

## 2017-09-06 DIAGNOSIS — S52.501A: ICD-10-CM

## 2017-09-06 DIAGNOSIS — S69.91XA: Primary | ICD-10-CM

## 2017-09-06 LAB
ALBUMIN/GLOB SERPL: 1.2 {RATIO} (ref 1–2.1)
ALP SERPL-CCNC: 227 U/L (ref 38–126)
ALT SERPL-CCNC: 36 U/L (ref 9–52)
AST SERPL-CCNC: 41 U/L (ref 14–36)
BASOPHILS # BLD AUTO: 0.1 K/UL (ref 0–0.2)
BASOPHILS NFR BLD: 1.4 % (ref 0–2)
BILIRUB SERPL-MCNC: 0.7 MG/DL (ref 0.2–1.3)
BILIRUB UR-MCNC: NEGATIVE MG/DL
BUN SERPL-MCNC: 36 MG/DL (ref 7–17)
CALCIUM SERPL-MCNC: 8.9 MG/DL (ref 8.6–10.4)
CHLORIDE SERPL-SCNC: 89 MMOL/L (ref 98–107)
CO2 SERPL-SCNC: 31 MMOL/L (ref 22–30)
EOSINOPHIL # BLD AUTO: 0.6 K/UL (ref 0–0.7)
EOSINOPHIL NFR BLD: 5.7 % (ref 0–4)
ERYTHROCYTE [DISTWIDTH] IN BLOOD BY AUTOMATED COUNT: 17.6 % (ref 11.5–14.5)
GLOBULIN SER-MCNC: 3.8 GM/DL (ref 2.2–3.9)
GLUCOSE SERPL-MCNC: 96 MG/DL (ref 65–105)
GLUCOSE UR STRIP-MCNC: NORMAL MG/DL
HCT VFR BLD CALC: 36.3 % (ref 34–47)
HYALINE CASTS #/AREA URNS LPF: (no result) /LPF (ref 0–2)
KETONES UR STRIP-MCNC: (no result) MG/DL
LEUKOCYTE ESTERASE UR-ACNC: (no result) LEU/UL
LYMPHOCYTES # BLD AUTO: 1.5 K/UL (ref 1–4.3)
LYMPHOCYTES NFR BLD AUTO: 15.7 % (ref 20–40)
MCH RBC QN AUTO: 25.1 PG (ref 27–31)
MCHC RBC AUTO-ENTMCNC: 32.3 G/DL (ref 33–37)
MCV RBC AUTO: 77.8 FL (ref 81–99)
MONOCYTES # BLD: 1.2 K/UL (ref 0–0.8)
MONOCYTES NFR BLD: 12.2 % (ref 0–10)
NRBC BLD AUTO-RTO: 0.1 % (ref 0–2)
PH UR STRIP: 5 [PH] (ref 5–8)
PLATELET # BLD: 241 K/UL (ref 130–400)
PMV BLD AUTO: 8.8 FL (ref 7.2–11.7)
POTASSIUM SERPL-SCNC: 3.4 MMOL/L (ref 3.6–5.2)
PROT SERPL-MCNC: 8.1 G/DL (ref 6.3–8.3)
PROT UR STRIP-MCNC: (no result) MG/DL
RBC # UR STRIP: (no result) /UL
RBC #/AREA URNS HPF: 5 /HPF (ref 0–3)
SODIUM SERPL-SCNC: 137 MMOL/L (ref 132–148)
SP GR UR STRIP: 1.02 (ref 1–1.03)
UROBILINOGEN UR-MCNC: NORMAL MG/DL (ref 0.2–1)
WBC # BLD AUTO: 9.6 K/UL (ref 4.8–10.8)
WBC #/AREA URNS HPF: 62 /HPF (ref 0–5)
WBC CLUMPS # UR AUTO: (no result) /HPF

## 2017-09-06 NOTE — C.PDOC
History Of Present Illness





78 y/o female c/o pain and swelling to the right wrist after falling PTA. 

Denies LOC, head injury, weakness, numbness, or any other complaints.





Time Seen by Provider: 09/06/17 01:02


Chief Complaint (Nursing): Finger,Hand,&Wrist


History Per: Patient


History/Exam Limitations: no limitations


Onset/Duration Of Symptoms: Hrs


Current Symptoms Are (Timing): Still Present


Severity: Mild


Exacerbating Factor(s): Movement


Recent travel outside of the [place]: No


Additional History Per: Patient





Past Medical History


Reviewed: Historical Data, Nursing Documentation, Vital Signs


Vital Signs: 


 Last Vital Signs











Temp  97.5 F L  09/06/17 02:24


 


Pulse  79   09/06/17 02:24


 


Resp  18   09/06/17 02:24


 


BP  129/74   09/06/17 02:24


 


Pulse Ox  96   09/06/17 06:14














- Medical History


PMH: Anemia, Asthma, CAD, Diabetes, HTN, Hypothyroidism, End Stage Renal Disease

, Chronic Kidney Disease


   Denies: Kidney Stones


Surgical History: Coronary Stent





- [facility] Procedures








ASPIRATION SKIN & SUBQ (05/24/14)


EXCISION OF RIGHT LOBE LIVER, PERCUTANEOUS APPROACH, DIAGN (05/02/16)


FLUOROSCOPY OF GALLBLADDER & BILE DUCT USING L OSM CONTRAST (05/02/16)


HEMODIALYSIS (03/20/15)


PERFORMANCE OF URINARY FILTRATION, MULTIPLE (06/26/17)


PERFORMANCE OF URINARY FILTRATION, SINGLE (10/03/16)


RESECTION OF GALLBLADDER, PERCUTANEOUS ENDOSCOPIC APPROACH (05/02/16)








Family History: States: Unknown Family Hx





- Social History


Hx Tobacco Use: No


Hx Alcohol Use: No


Hx Substance Use: No





- Immunization History


Hx Tetanus Toxoid Vaccination: No


Hx Influenza Vaccination: Yes


Hx Pneumococcal Vaccination: Yes





Review Of Systems


Constitutional: Negative for: Other (Head injury)


Musculoskeletal: Positive for: Hand Pain (Pain and swelling to the right wrist)


Neurological: Negative for: Weakness, Numbness, Other (LOC)





Physical Exam





- Physical Exam


Appears: Non-toxic, No Acute Distress


Skin: Warm, Dry


Head: Atraumatic, Normacephalic


Eye(s): bilateral: Normal Inspection


Extremity: Tenderness (Tenderness to the dorsal aspect of the right wrist.), 

Capillary Refill (<2secs), No Deformity, Swelling (to dorsal aspect of right 

wrist)


Pulses: Left Radial: Normal, Right Radial: Normal


Neurological/Psych: Oriented x3, Normal Motor, Normal Sensation





ED Course And Treatment


O2 Sat by Pulse Oximetry: 96 (RA)


Pulse Ox Interpretation: Normal


Progress Note: Plans: XRAy right wrist, Tylenol.  XRAY right wrist shows 

questionable fracture to the distal radius. Pt placed in a volar splint and 

sling by CP and checked by me. Patient is in no acute distress and was 

instructed to follow up with Orthopedist for further evaluation and to return 

if symptoms worsens.





Orthopedic


Time Out: Side verified


Procedure: Splint


Type: Short, Volar


Location: Right, Hand


Consent obtained: Verbal


Performed by: Mid-level Provider (by CP and checked by me)


Diagnosis: Fracture


Type: Non-displaced


Location: Right, Volar





Disposition


Counseled Patient/Family Regarding: Studies Performed, Diagnosis, Need For 

Followup, Rx Given





- Disposition


Referrals: 


Hoang Barajas MD [Staff Provider] - 


Disposition: HOME/ ROUTINE


Disposition Time: 02:05


Condition: STABLE


Additional Instructions: 


Keep splnt until Hand doctor follwo up 





Apply ICE





Tylenol for pain





Wear sling for support





Return to ER if worse


Instructions:  SUSPECTED FRACTURE (ED)


Forms:  [facility] (English)





- Clinical Impression


Clinical Impression: 


 Suspected fracture of bone, Right wrist injury








- Scribe Statement


The provider has reviewed the documentation as recorded by the Scribe





Jeremy tavares





All medical record entries made by the Scribe were at my direction and 

personally dictated by me. I have reviewed the chart and agree that the record 

accurately reflects my personal performance of the history, physical exam, 

medical decision making, and the department course for this patient. I have 

also personally directed, reviewed, and agree with the discharge instructions 

and disposition.

## 2017-09-06 NOTE — C.PDOC
History Of Present Illness


77 year old female, with PMHx of ESRD on HD, presents to ED for evaluation of 

mild lower abdominal pain, and nausea that began 2 hours into her dialysis 

session this morning. Pt states her symptoms have now improved, and does not 

have abdominal pain or nausea at this time. Pt also complains of right arm pain

,  was seen overnight in our ED  for a fall, and was found to have wrist 

fracture. Pt was discharged home with prescription of Tylenol, which she has 

been taking without relief.  Patient denies chest pain, shortness of breath, 

vomiting, diarrhea, fever.  Notes she still makes some urine occasionally. 


Time Seen by Provider: 09/06/17 08:07


Chief Complaint (Nursing): Abdominal Pain


History Per: Patient


History/Exam Limitations: no limitations


Onset/Duration Of Symptoms: Days


Current Symptoms Are (Timing): Better


Severity: Mild


Location Of Pain/Discomfort: Suprapubic


Radiation Of Pain To:: None


Quality Of Discomfort: "Pain"


Associated Symptoms: Nausea.  denies: Vomiting, Loss Of Appetite, Back Pain, 

Chest Pain, Constipation, Urinary Symptoms


Exacerbating Factors: None


Alleviating Factors: None


Additional History Per: Patient


Abnormal Vaginal Bleeding: No





Past Medical History


Reviewed: Historical Data, Nursing Documentation, Vital Signs


Vital Signs: 


 Last Vital Signs











Temp  97.9 F   09/06/17 10:56


 


Pulse  75   09/06/17 10:56


 


Resp  18   09/06/17 10:56


 


BP  170/65 H  09/06/17 10:56


 


Pulse Ox  98   09/06/17 11:50














- Medical History


PMH: Anemia, Asthma, CAD, Diabetes, HTN, Hypothyroidism, End Stage Renal Disease

, Chronic Kidney Disease


Surgical History: Coronary Stent





- [facility] Procedures








ASPIRATION SKIN & SUBQ (05/24/14)


EXCISION OF RIGHT LOBE LIVER, PERCUTANEOUS APPROACH, DIAGN (05/02/16)


FLUOROSCOPY OF GALLBLADDER & BILE DUCT USING L OSM CONTRAST (05/02/16)


HEMODIALYSIS (03/20/15)


PERFORMANCE OF URINARY FILTRATION, MULTIPLE (06/26/17)


PERFORMANCE OF URINARY FILTRATION, SINGLE (10/03/16)


RESECTION OF GALLBLADDER, PERCUTANEOUS ENDOSCOPIC APPROACH (05/02/16)








Family History: States: No Known Family Hx





- Social History


Hx Tobacco Use: No


Hx Alcohol Use: No


Hx Substance Use: No





- Immunization History


Hx Tetanus Toxoid Vaccination: No


Hx Influenza Vaccination: Yes


Hx Pneumococcal Vaccination: Yes





Review Of Systems


Except As Marked, All Systems Reviewed And Found Negative.


Constitutional: Negative for: Fever, Chills


Cardiovascular: Negative for: Chest Pain, Palpitations


Respiratory: Negative for: Shortness of Breath


Gastrointestinal: Positive for: Nausea, Abdominal Pain.  Negative for: Vomiting

, Diarrhea


Musculoskeletal: Positive for: Arm Pain (right)


Neurological: Negative for: Weakness, Numbness





Physical Exam





- Physical Exam


Appears: Well, Non-toxic, No Acute Distress


Skin: Normal Color, Warm, Dry, No Rash


Head: Normacephalic


Oral Mucosa: Moist


Neck: Normal, Supple


Cardiovascular: Rhythm Regular


Respiratory: Normal Breath Sounds, No Rales, No Rhonchi, No Wheezing


Gastrointestinal/Abdominal: Bowel Sounds, Soft, Tenderness (mild suprapubic TTP)

, No Distention, No Guarding, No Rebound, Other ((-) McBurney's)


Back: No CVA Tenderness


Extremity: Normal ROM, Pedal Edema (+2 pitting edema to b/l lower extremities), 

Capillary Refill (<2 sec.), No Deformity, Other (Right foreram in splint, Left 

arm: AV fistula with palpable thrill)


Pulses: Left Radial: Normal, Right Radial: Normal


Neurological/Psych: Oriented x3, Normal Motor





ED Course And Treatment





- Laboratory Results


Result Diagrams: 


 09/06/17 08:49





 09/06/17 08:49


O2 Sat by Pulse Oximetry: 98 (on RA)


Pulse Ox Interpretation: Normal


Progress Note: Blood work, UA ordered and reviewed. Pror ED visit reviewed, 

patient diagnosed with distal radius fx.  Pt was given PO Tylenol#3.  UA (+) 

for UTI, PO Ciprofloxacin given.


Reevaluation Time: 11:00


Reassessment Condition: Improved (On reassessment, patient is resting 

comfortably, in no current pain/distress.  She states her pain has resolved and 

she feels better.  On exam, abdomen is soft and nontender.  UA (+) for UTI - 

patient given Rxs for Ciprofloxacin and Tylenol.  She was instructed to follow 

up with PMD in 1-2 days, and with orthopedics within 1 week as instructed 

during earlier ED visit.  She understands she should return to ED if symptoms 

worsen.)





Disposition


Counseled Patient/Family Regarding: Studies Performed, Diagnosis, Need For 

Followup, Rx Given





- Disposition


Referrals: 


[facility] [Outside]


Disposition: HOME/ ROUTINE


Disposition Time: 11:00


Condition: STABLE


Additional Instructions: 


FOLLOW UP WITH YOUR DOCTOR IN 1-2 DAYS, AND WITH ORTHOPEDICS AS PREVIOUSLY 

INSTRUCTED (DURING PRIOR VISIT)





USE MEDICATIONS AS DIRECTED





RETURN TO ER IF SYMPTOMS WORSEN


Prescriptions: 


Acetaminophen with Codeine [Tylenol with Codeine #3 Tablet] 1 each PO Q6 PRN #

15 tablet


 PRN Reason: pain 


Ciprofloxacin [Cipro] 1 tab PO BID #14 tab


Instructions:  Urinary Tract Infection in Women (ED), Arthralgia (ED)


Forms:  [facility] (English)


Print Language: ENGLISH





- Clinical Impression


Clinical Impression: 


 UTI (urinary tract infection), Right wrist pain, Distal radius fracture








- Scribe Statement


The provider has reviewed the documentation as recorded by the Rupaibjennifer Levin





All medical record entries made by the Darian were at my direction and 

personally dictated by me. I have reviewed the chart and agree that the record 

accurately reflects my personal performance of the history, physical exam, 

medical decision making, and the department course for this patient. I have 

also personally directed, reviewed, and agree with the discharge instructions 

and disposition.

## 2017-09-10 ENCOUNTER — HOSPITAL ENCOUNTER (EMERGENCY)
Dept: HOSPITAL 31 - C.ER | Age: 77
LOS: 1 days | Discharge: HOME | End: 2017-09-11
Payer: MEDICARE

## 2017-09-10 VITALS — BODY MASS INDEX: 25 KG/M2

## 2017-09-10 DIAGNOSIS — I12.0: ICD-10-CM

## 2017-09-10 DIAGNOSIS — R11.2: Primary | ICD-10-CM

## 2017-09-10 DIAGNOSIS — E11.9: ICD-10-CM

## 2017-09-10 DIAGNOSIS — I25.10: ICD-10-CM

## 2017-09-10 DIAGNOSIS — N18.6: ICD-10-CM

## 2017-09-10 DIAGNOSIS — Z99.2: ICD-10-CM

## 2017-09-10 DIAGNOSIS — I48.91: ICD-10-CM

## 2017-09-10 PROCEDURE — 85025 COMPLETE CBC W/AUTO DIFF WBC: CPT

## 2017-09-10 PROCEDURE — 96375 TX/PRO/DX INJ NEW DRUG ADDON: CPT

## 2017-09-10 PROCEDURE — 96374 THER/PROPH/DIAG INJ IV PUSH: CPT

## 2017-09-10 PROCEDURE — 99285 EMERGENCY DEPT VISIT HI MDM: CPT

## 2017-09-10 PROCEDURE — 80053 COMPREHEN METABOLIC PANEL: CPT

## 2017-09-10 PROCEDURE — 83690 ASSAY OF LIPASE: CPT

## 2017-09-11 VITALS — TEMPERATURE: 94 F | OXYGEN SATURATION: 100 %

## 2017-09-11 VITALS — RESPIRATION RATE: 17 BRPM | HEART RATE: 120 BPM | SYSTOLIC BLOOD PRESSURE: 166 MMHG | DIASTOLIC BLOOD PRESSURE: 64 MMHG

## 2017-09-11 LAB
ALBUMIN/GLOB SERPL: 1.2 {RATIO} (ref 1–2.1)
ALP SERPL-CCNC: 186 U/L (ref 38–126)
ALT SERPL-CCNC: 32 U/L (ref 9–52)
AST SERPL-CCNC: 35 U/L (ref 14–36)
BASOPHILS # BLD AUTO: 0.1 K/UL (ref 0–0.2)
BASOPHILS NFR BLD: 1.1 % (ref 0–2)
BILIRUB SERPL-MCNC: 0.8 MG/DL (ref 0.2–1.3)
BUN SERPL-MCNC: 57 MG/DL (ref 7–17)
CALCIUM SERPL-MCNC: 9.2 MG/DL (ref 8.6–10.4)
CHLORIDE SERPL-SCNC: 84 MMOL/L (ref 98–107)
CO2 SERPL-SCNC: 25 MMOL/L (ref 22–30)
EOSINOPHIL # BLD AUTO: 0.2 K/UL (ref 0–0.7)
EOSINOPHIL NFR BLD: 1.9 % (ref 0–4)
ERYTHROCYTE [DISTWIDTH] IN BLOOD BY AUTOMATED COUNT: 17.8 % (ref 11.5–14.5)
GLOBULIN SER-MCNC: 3.9 GM/DL (ref 2.2–3.9)
GLUCOSE SERPL-MCNC: 124 MG/DL (ref 65–105)
HCT VFR BLD CALC: 39.9 % (ref 34–47)
LYMPHOCYTES # BLD AUTO: 1.3 K/UL (ref 1–4.3)
LYMPHOCYTES NFR BLD AUTO: 10 % (ref 20–40)
MCH RBC QN AUTO: 24.7 PG (ref 27–31)
MCHC RBC AUTO-ENTMCNC: 31.4 G/DL (ref 33–37)
MCV RBC AUTO: 78.6 FL (ref 81–99)
MONOCYTES # BLD: 0.9 K/UL (ref 0–0.8)
MONOCYTES NFR BLD: 6.9 % (ref 0–10)
NRBC BLD AUTO-RTO: 0 % (ref 0–2)
PLATELET # BLD: 272 K/UL (ref 130–400)
PMV BLD AUTO: 8.6 FL (ref 7.2–11.7)
POTASSIUM SERPL-SCNC: 4.8 MMOL/L (ref 3.6–5.2)
PROT SERPL-MCNC: 8.5 G/DL (ref 6.3–8.3)
SODIUM SERPL-SCNC: 136 MMOL/L (ref 132–148)
WBC # BLD AUTO: 12.5 K/UL (ref 4.8–10.8)

## 2017-09-11 NOTE — C.PDOC
History Of Present Illness


Patient presents to the ER with a complaint of feeling nauseous. Patient states 

she is a Monday, Wednesday, Friday dialysis patient, she notes she only ate 3 

potato pancakes today and took her medications on an empty stomach. Patient 

reports her blood sugar at home was 88; denies chest pain, palpitations, or 

SOB. 


Time Seen by Provider: 09/11/17 00:14


Chief Complaint (Nursing): Abdominal Pain


History Per: Patient


History/Exam Limitations: no limitations


Onset/Duration Of Symptoms: Hrs


Current Symptoms Are (Timing): Still Present


Severity: Moderate


Pain Scale Rating Of: 4


Quality Of Discomfort: Unable To Describe


Associated Symptoms: Nausea.  denies: Fever, Chills, Vomiting


Exacerbating Factors: None


Alleviating Factors: None


Recent travel outside of the [place]: No


Abnormal Vaginal Bleeding: No





Past Medical History


Reviewed: Historical Data, Nursing Documentation, Vital Signs


Vital Signs: 


 Last Vital Signs











Temp  94 F L  09/11/17 00:00


 


Pulse  120 H  09/11/17 01:11


 


Resp  17   09/11/17 01:11


 


BP  166/64 H  09/11/17 01:11


 


Pulse Ox  100   09/11/17 01:11














- Medical History


PMH: Anemia, Asthma, Atrial Fibrillation, CAD, Diabetes, HTN, Hypothyroidism, 

End Stage Renal Disease, Chronic Kidney Disease


Surgical History: Coronary Stent





- [facility] Procedures








ASPIRATION SKIN & SUBQ (05/24/14)


EXCISION OF RIGHT LOBE LIVER, PERCUTANEOUS APPROACH, DIAGN (05/02/16)


FLUOROSCOPY OF GALLBLADDER & BILE DUCT USING L OSM CONTRAST (05/02/16)


HEMODIALYSIS (03/20/15)


PERFORMANCE OF URINARY FILTRATION, MULTIPLE (06/26/17)


PERFORMANCE OF URINARY FILTRATION, SINGLE (10/03/16)


RESECTION OF GALLBLADDER, PERCUTANEOUS ENDOSCOPIC APPROACH (05/02/16)








Family History: States: Unknown Family Hx





- Social History


Hx Tobacco Use: No


Hx Alcohol Use: No


Hx Substance Use: No





- Immunization History


Hx Tetanus Toxoid Vaccination: No


Hx Influenza Vaccination: Yes


Hx Pneumococcal Vaccination: Yes





Review Of Systems


Constitutional: Negative for: Fever, Chills


Cardiovascular: Negative for: Chest Pain, Palpitations


Respiratory: Negative for: Shortness of Breath


Gastrointestinal: Positive for: Nausea.  Negative for: Vomiting





Physical Exam





- Physical Exam


Appears: Non-toxic


Skin: Warm, Dry


Head: Normacephalic


Oral Mucosa: Moist


Teeth: Edentulous


Chest: Symmetrical


Cardiovascular: Rhythm Regular


Respiratory: No Rales, No Rhonchi, No Wheezing


Gastrointestinal/Abdominal: Soft, No Tenderness, Distention (Mildly), Other (

Tympanic to percussion)


Extremity: Other (Right hand in splint from prior fall with wrist fracture. 

Right dialysis shunt with good thrill and bruit)


Neurological/Psych: Oriented x3





ED Course And Treatment





- Laboratory Results


Result Diagrams: 


 09/11/17 00:42





 09/11/17 00:42


ECG: Interpreted By Me, Viewed By Me


ECG Rhythm: Sinus Rhythm (70), Nonspecific Changes


O2 Sat by Pulse Oximetry: 100 (Room air)


Pulse Ox Interpretation: Normal





- Radiology


CXR: Interpreted by Me


Progress Note: EKG and blood work ordered. Pepcid and zofran administered.  1:

37 AM pt feels fine and wants to go home so she can have her HD


Reevaluation Time: 01:35


Reassessment Condition: Improved





Medical Decision Making


Medical Decision Making: 





Upon provider reevaluation patient is feeling better, is medically stable, and 

requires no further treatment in the ED at this time. Patient will be 

discharged home with Rx for zofran . Counseling was provided and all questions 

were answered regarding diagnosis and need for follow up withdr wang. There is 

agreement to discharge plan. Return if symptoms persist or worsen





Disposition


Counseled Patient/Family Regarding: Studies Performed, Diagnosis, Need For 

Followup, Rx Given





- Disposition


Referrals: 


Moise Wang MD [Staff Provider] - 


Disposition: HOME/ ROUTINE


Disposition Time: 00:14


Condition: FAIR


Prescriptions: 


Ondansetron ODT [Zofran ODT] 1 odt PO BID PRN #10 odt


 PRN Reason: Nausea/Vomiting


Instructions:  Acute Nausea and Vomiting (ED)


Forms:  [facility] (English)





- Clinical Impression


Clinical Impression: 


 Nausea & vomiting








- Scribe Statement


The provider has reviewed the documentation as recorded by the Scribe





Reginald Workman





All medical record entries made by the Scribe were at my direction and 

personally dictated by me. I have reviewed the chart and agree that the record 

accurately reflects my personal performance of the history, physical exam, 

medical decision making, and the department course for this patient. I have 

also personally directed, reviewed, and agree with the discharge instructions 

and disposition.

## 2017-09-20 ENCOUNTER — HOSPITAL ENCOUNTER (OUTPATIENT)
Dept: HOSPITAL 31 - C.ER | Age: 77
Setting detail: OBSERVATION
LOS: 1 days | Discharge: HOME | End: 2017-09-21
Attending: INTERNAL MEDICINE | Admitting: INTERNAL MEDICINE
Payer: MEDICARE

## 2017-09-20 VITALS — OXYGEN SATURATION: 97 %

## 2017-09-20 VITALS — BODY MASS INDEX: 25 KG/M2

## 2017-09-20 DIAGNOSIS — I12.0: ICD-10-CM

## 2017-09-20 DIAGNOSIS — Z79.4: ICD-10-CM

## 2017-09-20 DIAGNOSIS — Z99.2: ICD-10-CM

## 2017-09-20 DIAGNOSIS — E11.22: ICD-10-CM

## 2017-09-20 DIAGNOSIS — K56.41: Primary | ICD-10-CM

## 2017-09-20 DIAGNOSIS — Z95.5: ICD-10-CM

## 2017-09-20 DIAGNOSIS — E03.9: ICD-10-CM

## 2017-09-20 DIAGNOSIS — I48.91: ICD-10-CM

## 2017-09-20 DIAGNOSIS — N18.6: ICD-10-CM

## 2017-09-20 DIAGNOSIS — Z91.81: ICD-10-CM

## 2017-09-20 DIAGNOSIS — J45.909: ICD-10-CM

## 2017-09-20 DIAGNOSIS — Z86.73: ICD-10-CM

## 2017-09-20 DIAGNOSIS — I25.10: ICD-10-CM

## 2017-09-20 LAB
ALBUMIN/GLOB SERPL: 1.3 {RATIO} (ref 1–2.1)
ALP SERPL-CCNC: 117 U/L (ref 38–126)
ALT SERPL-CCNC: 27 U/L (ref 9–52)
APTT BLD: 31 SECONDS (ref 21–34)
AST SERPL-CCNC: 35 U/L (ref 14–36)
BASOPHILS # BLD AUTO: 0.1 K/UL (ref 0–0.2)
BASOPHILS NFR BLD: 1.4 % (ref 0–2)
BILIRUB SERPL-MCNC: 0.8 MG/DL (ref 0.2–1.3)
BUN SERPL-MCNC: 28 MG/DL (ref 7–17)
CALCIUM SERPL-MCNC: 8.8 MG/DL (ref 8.6–10.4)
CHLORIDE SERPL-SCNC: 90 MMOL/L (ref 98–107)
CO2 SERPL-SCNC: 32 MMOL/L (ref 22–30)
EOSINOPHIL # BLD AUTO: 0.4 K/UL (ref 0–0.7)
EOSINOPHIL NFR BLD: 4.4 % (ref 0–4)
ERYTHROCYTE [DISTWIDTH] IN BLOOD BY AUTOMATED COUNT: 17.9 % (ref 11.5–14.5)
GLOBULIN SER-MCNC: 3.2 GM/DL (ref 2.2–3.9)
GLUCOSE SERPL-MCNC: 141 MG/DL (ref 65–105)
HCT VFR BLD CALC: 32.5 % (ref 34–47)
INR PPP: 1
LYMPHOCYTES # BLD AUTO: 1.4 K/UL (ref 1–4.3)
LYMPHOCYTES NFR BLD AUTO: 16.2 % (ref 20–40)
MCH RBC QN AUTO: 25.5 PG (ref 27–31)
MCHC RBC AUTO-ENTMCNC: 32.7 G/DL (ref 33–37)
MCV RBC AUTO: 77.9 FL (ref 81–99)
MONOCYTES # BLD: 0.9 K/UL (ref 0–0.8)
MONOCYTES NFR BLD: 10.8 % (ref 0–10)
NRBC BLD AUTO-RTO: 0 % (ref 0–2)
PLATELET # BLD: 240 K/UL (ref 130–400)
PMV BLD AUTO: 8.4 FL (ref 7.2–11.7)
POTASSIUM SERPL-SCNC: 4.5 MMOL/L (ref 3.6–5.2)
PROT SERPL-MCNC: 7.3 G/DL (ref 6.3–8.3)
SODIUM SERPL-SCNC: 137 MMOL/L (ref 132–148)
WBC # BLD AUTO: 8.6 K/UL (ref 4.8–10.8)

## 2017-09-20 PROCEDURE — 87340 HEPATITIS B SURFACE AG IA: CPT

## 2017-09-20 PROCEDURE — 83690 ASSAY OF LIPASE: CPT

## 2017-09-20 PROCEDURE — 82948 REAGENT STRIP/BLOOD GLUCOSE: CPT

## 2017-09-20 PROCEDURE — 96374 THER/PROPH/DIAG INJ IV PUSH: CPT

## 2017-09-20 PROCEDURE — 99285 EMERGENCY DEPT VISIT HI MDM: CPT

## 2017-09-20 PROCEDURE — 97162 PT EVAL MOD COMPLEX 30 MIN: CPT

## 2017-09-20 PROCEDURE — 86706 HEP B SURFACE ANTIBODY: CPT

## 2017-09-20 PROCEDURE — 86803 HEPATITIS C AB TEST: CPT

## 2017-09-20 PROCEDURE — 85025 COMPLETE CBC W/AUTO DIFF WBC: CPT

## 2017-09-20 PROCEDURE — 74022 RADEX COMPL AQT ABD SERIES: CPT

## 2017-09-20 PROCEDURE — 85610 PROTHROMBIN TIME: CPT

## 2017-09-20 PROCEDURE — 80053 COMPREHEN METABOLIC PANEL: CPT

## 2017-09-20 PROCEDURE — 85730 THROMBOPLASTIN TIME PARTIAL: CPT

## 2017-09-20 NOTE — RAD
PROCEDURE:  Radiographs of the chest and abdomen (obstructive series)



HISTORY:

abd pain  



COMPARISON:

No prior.



TECHNIQUE:

AP radiograph of the chest, with upright and supine radiographs of 

the abdomen.



FINDINGS:



CHEST:

Lungs: No evidence of acute pulmonary disease.



Cardiovascular: The cardiac silhouette is mildly enlarged. Mild 

pulmonary vascular congestion noted.



Pleura: No pleural fluid. No pneumothorax.



Other findings: None.



ABDOMEN AND PELVIS:

Bowel: Moderate constipation is noted.  No radiographic evidence of 

small bowel obstruction.



Free air: None.



Bones:  Advanced degenerative changes.



Other findings: Status post cholecystectomy.



IMPRESSION:

Moderate constipation. No radiographic evidence of small bowel 

obstruction.



Prominent size of the heart and possible JANN mild pulmonary vascular 

congestion.

## 2017-09-20 NOTE — CP.PCM.HP
History of Present Illness





- History of Present Illness


History of Present Illness: 





77 years old female came to the ED at [facility], complaining of abdominal 

pain for the past 2 days. A CTscan of the abdomen reveals fecal impaction. The 

patient denies any fever, nausea, vomiting. She is known to have an IDDM, an 

ESRD on HD, a HPTN, she sustained a fracture of the right radius from a fall at 

home on 9/6/2017.





Present on Admission





- Present on Admission


Any Indicators Present on Admission: No





Review of Systems





- Gastrointestinal


Gastrointestinal: Abdominal Pain, Constipation





Past Patient History





- Infectious Disease


Hx of Infectious Diseases: None





- Tetanus Immunizations


Tetanus Immunization: Unknown





- Past Medical History & Family History


Past Medical History?: Yes





- Past Social History


Smoking Status: Never Smoked


Alcohol: None


Home Situation {Lives}: Alone





- CARDIAC


Hx Atrial Fibrillation: Yes


Hx Hypertension: Yes





- PULMONARY


Hx Asthma: Yes





- NEUROLOGICAL


Hx Neurological Disorder: No





- HEENT


Hx HEENT Problems: Yes


Other/Comment: wear eyeglasses





- RENAL


Hx Chronic Kidney Disease: Yes


Hx Dialysis: Yes


Type of Dialysis Access: arterioveous shunt right upper arm


Date of Last Dialysis Treatment: 09/20/17


Hx Kidney Stones: No


Hx Renal Failure: Yes





- ENDOCRINE/METABOLIC


Hx Diabetes Mellitus Type 1: Yes


Hx Diabetes Mellitus Type 2: Yes


Hx Hypothyroidism: Yes





- HEMATOLOGICAL/ONCOLOGICAL


Hx Anemia: Yes





- INTEGUMENTARY


Hx Dermatological Problems: No





- MUSCULOSKELETAL/RHEUMATOLOGICAL


Hx Musculoskeletal Disorders: No


Hx Falls: Yes (2 weeks ago)


Hx Fractures: Yes (right wrist w/soft cast s/p fall 2 weeks ago)





- GASTROINTESTINAL


Hx Gastrointestinal Disorders: No


Hx Constipation: Yes





- GENITOURINARY/GYNECOLOGICAL


Hx Genitourinary Disorders: No





- PSYCHIATRIC


Hx Substance Use: No





- SURGICAL HISTORY


Hx Arteriovenous Shunt: Yes


Hx Coronary Stent: Yes





- ANESTHESIA


Hx Anesthesia: Yes


Hx Anesthesia Reactions: No


Hx Malignant Hyperthermia: No





Meds


Allergies/Adverse Reactions: 


 Allergies











Allergy/AdvReac Type Severity Reaction Status Date / Time


 


No Known Allergies Allergy   Verified 09/06/17 08:15














Physical Exam





- Constitutional


Appears: No Acute Distress, Chronically Ill





- Head Exam


Head Exam: NORMAL INSPECTION





- Eye Exam


Eye Exam: Normal appearance





- ENT Exam


ENT Exam: Normal Exam





- Neck Exam


Neck exam: Positive for: Normal Inspection





- Respiratory Exam


Respiratory Exam: Clear to Auscultation Bilateral





- Cardiovascular Exam


Cardiovascular Exam: REGULAR RHYTHM





- GI/Abdominal Exam


GI & Abdominal Exam: Distended, Hypoactive Bowel Sounds





- Rectal Exam


Rectal Exam: Deferred





- Extremities Exam


Additional comments: 





Right forearm under plaster.





- Back Exam


Back exam: NORMAL INSPECTION





- Neurological Exam


Neurological exam: Alert, Oriented x3





- Psychiatric Exam


Psychiatric exam: Anxious





- Skin


Skin Exam: Dry, Intact, Normal Color, Warm





Results





- Vital Signs


Recent Vital Signs: 





 Last Vital Signs











Temp  98.6 F   09/20/17 18:00


 


Pulse  80   09/20/17 18:00


 


Resp  20   09/20/17 18:00


 


BP  179/73 H  09/20/17 18:00


 


Pulse Ox  97   09/20/17 18:00














- Labs


Result Diagrams: 


 09/20/17 06:26





 09/20/17 06:26


Labs: 





 Laboratory Results - last 24 hr











  09/20/17 09/20/17 09/20/17





  06:26 06:26 06:26


 


WBC  8.6  


 


RBC  4.18  


 


Hgb  10.6 L  


 


Hct  32.5 L  


 


MCV  77.9 L  


 


MCH  25.5 L  


 


MCHC  32.7 L  


 


RDW  17.9 H  


 


Plt Count  240  


 


MPV  8.4  


 


Neut % (Auto)  67.2  


 


Lymph % (Auto)  16.2 L  


 


Mono % (Auto)  10.8 H  


 


Eos % (Auto)  4.4 H  


 


Baso % (Auto)  1.4  


 


Neut #  5.8  


 


Lymph #  1.4  


 


Mono #  0.9 H  


 


Eos #  0.4  


 


Baso #  0.1  


 


PT    10.9


 


INR    1.0


 


APTT    31


 


Sodium   137 


 


Potassium   4.5 


 


Chloride   90 L 


 


Carbon Dioxide   32 H 


 


Anion Gap   20 


 


BUN   28 H 


 


Creatinine   7.6 H* 


 


Est GFR ( Amer)   6 


 


Est GFR (Non-Af Amer)   5 


 


POC Glucose (mg/dL)   


 


Random Glucose   141 H 


 


Calcium   8.8 


 


Total Bilirubin   0.8 


 


AST   35 


 


ALT   27 


 


Alkaline Phosphatase   117 


 


Total Protein   7.3 


 


Albumin   4.0 


 


Globulin   3.2 


 


Albumin/Globulin Ratio   1.3 


 


Lipase   35 


 


Hep Bs Antigen   


 


Hep Bs Antibody   


 


Hepatitis C Antibody   














  09/20/17 09/20/17 09/20/17





  17:16 18:21 18:21


 


WBC   


 


RBC   


 


Hgb   


 


Hct   


 


MCV   


 


MCH   


 


MCHC   


 


RDW   


 


Plt Count   


 


MPV   


 


Neut % (Auto)   


 


Lymph % (Auto)   


 


Mono % (Auto)   


 


Eos % (Auto)   


 


Baso % (Auto)   


 


Neut #   


 


Lymph #   


 


Mono #   


 


Eos #   


 


Baso #   


 


PT   


 


INR   


 


APTT   


 


Sodium   


 


Potassium   


 


Chloride   


 


Carbon Dioxide   


 


Anion Gap   


 


BUN   


 


Creatinine   


 


Est GFR ( Amer)   


 


Est GFR (Non-Af Amer)   


 


POC Glucose (mg/dL)  110  


 


Random Glucose   


 


Calcium   


 


Total Bilirubin   


 


AST   


 


ALT   


 


Alkaline Phosphatase   


 


Total Protein   


 


Albumin   


 


Globulin   


 


Albumin/Globulin Ratio   


 


Lipase   


 


Hep Bs Antigen   Negative 


 


Hep Bs Antibody    Negative


 


Hepatitis C Antibody   Negative 














  09/20/17





  21:11


 


WBC 


 


RBC 


 


Hgb 


 


Hct 


 


MCV 


 


MCH 


 


MCHC 


 


RDW 


 


Plt Count 


 


MPV 


 


Neut % (Auto) 


 


Lymph % (Auto) 


 


Mono % (Auto) 


 


Eos % (Auto) 


 


Baso % (Auto) 


 


Neut # 


 


Lymph # 


 


Mono # 


 


Eos # 


 


Baso # 


 


PT 


 


INR 


 


APTT 


 


Sodium 


 


Potassium 


 


Chloride 


 


Carbon Dioxide 


 


Anion Gap 


 


BUN 


 


Creatinine 


 


Est GFR ( Amer) 


 


Est GFR (Non-Af Amer) 


 


POC Glucose (mg/dL)  217 H


 


Random Glucose 


 


Calcium 


 


Total Bilirubin 


 


AST 


 


ALT 


 


Alkaline Phosphatase 


 


Total Protein 


 


Albumin 


 


Globulin 


 


Albumin/Globulin Ratio 


 


Lipase 


 


Hep Bs Antigen 


 


Hep Bs Antibody 


 


Hepatitis C Antibody 














Assessment & Plan


(1) Fecal impaction


Assessment and Plan: 


Try Lactulose enema.


Status: Acute   





(2) ESRD needing dialysis


Assessment and Plan: 


As per Dr Garcia.


Status: Acute   





(3) Insulin dependent diabetes mellitus


Assessment and Plan: 


Cover blood glucose according to Accucheck findings.


Status: Acute   





(4) Hypertension


Assessment and Plan: 


To continue same home meds.


Status: Acute   





Decision To Admit





- Pt Status Changed To:


Hospital Disposition Of: Observation





- .


Bed Request Type: Regular


Admitting Physician: Moise Wang

## 2017-09-20 NOTE — CP.PCM.CON
History of Present Illness





- History of Present Illness


History of Present Illness: 





78 y/o AA female on dialysis about 14 years admitted with fecal impaction.


Unable to get enema in ED due to impaction


On dialysis now- routine treatment; has had fluid overload in past due to 

missed dialysis.





PMH:


ESRD


HTN


DM 2


S/P CVA


CAD





PSH:


AV F AND AV GRAFT


CORONARY STENTS





Review of Systems





- Constitutional


Constitutional: Daytime Sleepiness, Lethargy, Weight Loss





- EENT


Eyes: absent: As Per HPI, Blind Spots, Blurred Vision, Change in Vision, 

Decreased Night Vision, Diplopia, Discharge, Dry Eye, Exophthalmos, Floaters, 

Irritation, Itchy Eyes, Loss of Peripheral Vision, Pain, Photophobia, Requires 

Corrective Lenses, Sees Flashes, Spots in Vision, Tunnel Vision, Other Visual 

Disturbances, Loss of Vision, Other


Ears: absent: As Per HPI, Decreased Hearing, Ear Discharge, Ear Pain, Tinnitus, 

Abnormal Hearing, Disequilibrium, Dizziness, Other


Nose/Mouth/Throat: absent: As Per HPI, Epistaxis, Nasal Congestion, Nasal 

Discharge, Nasal Obstruction, Nasal Trauma, Nose Pain, Post Nasal Drip, Sinus 

Pain, Sinus Pressure, Bleeding Gums, Change in Voice, Dental Pain, Dry Mouth, 

Dysphagia, Halitosis, Hoarsness, Lip Swelling, Mouth Lesions, Mouth Pain, 

Odynophagia, Sore Throat, Throat Swelling, Tongue Swelling, Facial Pain, Neck 

Pain, Neck Mass, Other





- Cardiovascular


Cardiovascular: Dyspnea on Exertion, Leg Edema





- Respiratory


Respiratory: Cough, Dyspnea on Exertion





- Gastrointestinal


Gastrointestinal: Constipation, Early Satiety





- Genitourinary


Genitourinary: As Per HPI





- Musculoskeletal


Musculoskeletal: Muscle Cramps, Muscle Weakness





- Integumentary


Integumentary: absent: As Per HPI, Acne, Alopecia, Bleeding Lesions, Change in 

Hair, Change in Nails, Change in Pigmentation, Changing Lesions, Dry Skin, 

Erythema, Furuncle, Hirsutism, Lesions, New Lesions, Non-Healing Lesions, 

Photosensitivity, Pruritus, Rash, Skin Pain, Skin Ulcer, Sores, Striae, Swelling

, Unusual Bruising, Wounds, Jaundice, Other





- Neurological


Neurological: Weakness





Past Patient History





- Infectious Disease


Hx of Infectious Diseases: None





- Tetanus Immunizations


Tetanus Immunization: Unknown





- Past Medical History & Family History


Past Medical History?: Yes


Pertinent Family History: 





[facility]





- Past Social History


Smoking Status: Never Smoked


Chewing Tobacco Use: No


Cigar Use: No


Alcohol: None


Drugs: Denies


Home Situation {Lives}: Alone





- CARDIAC


Hx Atrial Fibrillation: Yes


Hx Hypertension: Yes





- PULMONARY


Hx Asthma: Yes





- NEUROLOGICAL


Hx Neurological Disorder: No





- HEENT


Hx HEENT Problems: Yes


Other/Comment: wear eyeglasses





- RENAL


Hx Chronic Kidney Disease: Yes


Hx Kidney Stones: No





- ENDOCRINE/METABOLIC


Hx Hypothyroidism: Yes





- HEMATOLOGICAL/ONCOLOGICAL


Hx Anemia: Yes





- INTEGUMENTARY


Hx Dermatological Problems: No





- MUSCULOSKELETAL/RHEUMATOLOGICAL


Hx Musculoskeletal Disorders: No


Hx Falls: No





- GASTROINTESTINAL


Hx Gastrointestinal Disorders: No





- GENITOURINARY/GYNECOLOGICAL


Hx Genitourinary Disorders: No





- PSYCHIATRIC


Hx Substance Use: No





- SURGICAL HISTORY


Hx Coronary Stent: Yes





- ANESTHESIA


Hx Anesthesia: Yes


Hx Anesthesia Reactions: No


Hx Malignant Hyperthermia: No





Meds


Allergies/Adverse Reactions: 


 Allergies











Allergy/AdvReac Type Severity Reaction Status Date / Time


 


No Known Allergies Allergy   Verified 09/06/17 08:15














- Medications


Medications: 


 Current Medications





Amlodipine Besylate (Norvasc)  10 mg PO DAILY [facility]


Aspirin (Ecotrin)  81 mg PO DAILY [facility]


Calcium Acetate (Phoslo)  667 mg PO DAILY [facility]


Carvedilol (Coreg)  3.125 mg PO BID [facility]


Cinacalcet (Sensipar)  30 mg PO DAILY [facility]


Clonidine HCl (Catapres)  0.2 mg PO BID [facility]


Losartan Potassium (Cozaar)  150 mg PO DAILY [facility]


Oxcarbazepine (Trileptal)  150 mg PO DAILY [facility]











Physical Exam





- Constitutional


Appears: Non-toxic, Chronically Ill





- Head Exam


Head Exam: ATRAUMATIC, NORMAL INSPECTION





- Eye Exam


Eye Exam: EOMI, Normal appearance





- Neck Exam


Neck exam: Positive for: Normal Inspection.  Negative for: Tenderness





- Respiratory Exam


Respiratory Exam: Clear to Auscultation Bilateral, NORMAL BREATHING PATTERN





- Cardiovascular Exam


Cardiovascular Exam: REGULAR RHYTHM, JVD





- GI/Abdominal Exam


GI & Abdominal Exam: Soft.  absent: Tenderness





- Extremities Exam


Extremities exam: Positive for: normal inspection.  Negative for: tenderness





- Neurological Exam


Neurological exam: Alert, CN II-XII Intact





- Skin


Skin Exam: Dry, Warm





Results





- Vital Signs


Recent Vital Signs: 


 Last Vital Signs











Temp  98.2 F   09/20/17 10:05


 


Pulse  71   09/20/17 10:05


 


Resp  18   09/20/17 10:05


 


BP  154/74 H  09/20/17 10:05


 


Pulse Ox  96   09/20/17 10:05














- Labs


Result Diagrams: 


 09/20/17 06:26





 09/20/17 06:26


Labs: 


 Laboratory Results - last 24 hr











  09/20/17 09/20/17 09/20/17





  06:26 06:26 06:26


 


WBC  8.6  


 


RBC  4.18  


 


Hgb  10.6 L  


 


Hct  32.5 L  


 


MCV  77.9 L  


 


MCH  25.5 L  


 


MCHC  32.7 L  


 


RDW  17.9 H  


 


Plt Count  240  


 


MPV  8.4  


 


Neut % (Auto)  67.2  


 


Lymph % (Auto)  16.2 L  


 


Mono % (Auto)  10.8 H  


 


Eos % (Auto)  4.4 H  


 


Baso % (Auto)  1.4  


 


Neut #  5.8  


 


Lymph #  1.4  


 


Mono #  0.9 H  


 


Eos #  0.4  


 


Baso #  0.1  


 


PT    10.9


 


INR    1.0


 


APTT    31


 


Sodium   137 


 


Potassium   4.5 


 


Chloride   90 L 


 


Carbon Dioxide   32 H 


 


Anion Gap   20 


 


BUN   28 H 


 


Creatinine   7.6 H* 


 


Est GFR ( Amer)   6 


 


Est GFR (Non-Af Amer)   5 


 


Random Glucose   141 H 


 


Calcium   8.8 


 


Total Bilirubin   0.8 


 


AST   35 


 


ALT   27 


 


Alkaline Phosphatase   117 


 


Total Protein   7.3 


 


Albumin   4.0 


 


Globulin   3.2 


 


Albumin/Globulin Ratio   1.3 


 


Lipase   35 














Assessment & Plan


(1) Abdominal pain


Status: Acute   





(2) Constipation


Status: Acute   





(3) ESRD needing dialysis


Status: Acute   





(4) CAD (coronary artery disease)


Status: Acute   





(5) CHF (congestive heart failure)


Status: Acute   





(6) DM type 2 causing ESRD


Status: Acute   





(7) Diabetes mellitus


Status: Acute   





(8) Hypertension


Status: Chronic   





- Assessment and Plan (Free Text)


Plan: 





Dialysis MWF


hold binders due to constipation


trial mineral oil, lactulose


might need manual disimpaction

## 2017-09-21 VITALS — HEART RATE: 60 BPM

## 2017-09-21 VITALS — RESPIRATION RATE: 20 BRPM | TEMPERATURE: 98.9 F

## 2017-09-21 VITALS — SYSTOLIC BLOOD PRESSURE: 165 MMHG | DIASTOLIC BLOOD PRESSURE: 77 MMHG

## 2017-09-21 RX ADMIN — INSULIN ASPART SCH UNIT: 100 INJECTION, SOLUTION INTRAVENOUS; SUBCUTANEOUS at 12:25

## 2017-09-21 RX ADMIN — INSULIN ASPART SCH UNIT: 100 INJECTION, SOLUTION INTRAVENOUS; SUBCUTANEOUS at 08:30

## 2017-09-21 RX ADMIN — INSULIN ASPART SCH: 100 INJECTION, SOLUTION INTRAVENOUS; SUBCUTANEOUS at 18:03

## 2017-09-21 NOTE — CP.PCM.PN
Subjective





- Date & Time of Evaluation


Date of Evaluation: 09/21/17


Time of Evaluation: 09:31





- Subjective


Subjective: 





in endoscopy, couldnt be examined


labile bp


hd yesterday





Objective





- Vital Signs/Intake and Output


Vital Signs (last 24 hours): 


 











Temp Pulse Resp BP Pulse Ox


 


 98 F   60   21   171/77 H  97 


 


 09/21/17 09:28  09/21/17 09:28  09/21/17 09:28  09/21/17 09:28  09/21/17 09:28








Intake and Output: 


 











 09/21/17 09/21/17





 06:59 18:59


 


Intake Total 750 


 


Balance 750 














- Medications


Medications: 


 Current Medications





Amlodipine Besylate (Norvasc)  10 mg PO DAILY [facility]


   Last Admin: 09/21/17 09:28 Dose:  10 mg


Aspirin (Ecotrin)  81 mg PO DAILY [facility]


   Last Admin: 09/21/17 09:24 Dose:  81 mg


Carvedilol (Coreg)  3.125 mg PO BID [facility]


   Last Admin: 09/21/17 09:24 Dose:  3.125 mg


Cinacalcet (Sensipar)  30 mg PO DAILY [facility]


   Last Admin: 09/21/17 09:28 Dose:  30 mg


Clonidine HCl (Catapres)  0.2 mg PO BID [facility]


   Last Admin: 09/21/17 09:23 Dose:  0.2 mg


Heparin Sodium (Porcine) (Heparin)  5,000 units SC Q12 [facility]


   Last Admin: 09/21/17 09:25 Dose:  5,000 units


Insulin Aspart (Novolog)  0 unit SC ACHS [facility]


   PRN Reason: Protocol


   Last Admin: 09/21/17 08:30 Dose:  1 unit


Lactulose (Enulose)  20 gm PO Q2H [facility]


   Last Admin: 09/21/17 08:47 Dose:  Not Given


Losartan Potassium (Cozaar)  150 mg PO DAILY [facility]


   Last Admin: 09/21/17 09:24 Dose:  150 mg


Mineral Oil (Mineral Oil 30ml)  30 ml PO ONCE [facility]


Oxcarbazepine (Trileptal)  150 mg PO DAILY [facility]


   Last Admin: 09/21/17 09:24 Dose:  150 mg











- Labs


Labs: 


 





 09/20/17 06:26 





 09/20/17 06:26 





 











PT  10.9 SECONDS (9.7-12.2)   09/20/17  06:26    


 


INR  1.0   09/20/17  06:26    


 


APTT  31 SECONDS (21-34)   09/20/17  06:26    














Assessment and Plan


(1) Constipation


Status: Acute   





(2) ESRD needing dialysis


Status: Acute   





(3) Fecal impaction


Status: Acute   





(4) Hypertension


Status: Acute   





(5) Insulin dependent diabetes mellitus


Status: Acute   





- Assessment and Plan (Free Text)


Assessment: 





maintain hd mwf


GI management

## 2018-01-05 ENCOUNTER — HOSPITAL ENCOUNTER (EMERGENCY)
Dept: HOSPITAL 31 - C.ER | Age: 78
Discharge: HOME | End: 2018-01-05
Payer: MEDICARE

## 2018-01-05 VITALS
HEART RATE: 73 BPM | SYSTOLIC BLOOD PRESSURE: 128 MMHG | OXYGEN SATURATION: 98 % | RESPIRATION RATE: 16 BRPM | DIASTOLIC BLOOD PRESSURE: 76 MMHG

## 2018-01-05 VITALS — TEMPERATURE: 98.3 F

## 2018-01-05 VITALS — BODY MASS INDEX: 27.5 KG/M2

## 2018-01-05 DIAGNOSIS — S09.90XA: Primary | ICD-10-CM
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
## 2018-01-05 NOTE — C.PDOC
History Of Present Illness


77 year old female presents to ED for evaluation of mild headache s/p slip and 

fall earlier today. Pt states that she slipped while walking on an icy sidewalk

, hitting the back of her head. Denies LOC, neck pain, dizziness, weakness, 

numbness, nausea, vomiting, or any other associated symptoms at this time. 








- HPI


Time Seen by Provider: 01/05/18 17:39


Chief Complaint (Nursing): Trauma


History Per: Patient


History/Exam Limitations: no limitations


Onset/Duration Of Symptoms: Hrs


Injury Occurred (Timing): Hours Ago:


Location Of Injury: Posterior: Head


Recent travel outside of the [place]: No


Additional History Per: Patient





- Fall


Fall:Prior To Injury: Slipped





Past Medical History


Reviewed: Historical Data, Nursing Documentation, Vital Signs


Vital Signs: 


 Last Vital Signs











Temp  98.3 F   01/05/18 17:18


 


Pulse  107 H  01/05/18 17:18


 


Resp  18   01/05/18 17:18


 


BP  131/84   01/05/18 17:18


 


Pulse Ox  100   01/05/18 19:37














- Medical History


PMH: Anemia, Arthritis (R HAND FX), Asthma, Atrial Fibrillation, CAD, Diabetes, 

Fractures (right wrist w/soft cast s/p fall 2 weeks ago), HTN, Hypothyroidism, 

End Stage Renal Disease, Chronic Kidney Disease


   Denies: Kidney Stones


Surgical History: Coronary Stent





- [facility] Procedures








ASPIRATION SKIN & SUBQ (05/24/14)


EXCISION OF RIGHT LOBE LIVER, PERCUTANEOUS APPROACH, DIAGN (05/02/16)


FLUOROSCOPY OF GALLBLADDER & BILE DUCT USING L OSM CONTRAST (05/02/16)


HEMODIALYSIS (03/20/15)


PERFORMANCE OF URINARY FILTRATION, MULTIPLE (06/26/17)


PERFORMANCE OF URINARY FILTRATION, SINGLE (10/03/16)


RESECTION OF GALLBLADDER, PERCUTANEOUS ENDOSCOPIC APPROACH (05/02/16)








Family History: States: Unknown Family Hx





- Social History


Hx Tobacco Use: No


Hx Alcohol Use: No


Hx Substance Use: No





- Immunization History


Hx Tetanus Toxoid Vaccination: No


Hx Influenza Vaccination: Yes


Hx Pneumococcal Vaccination: Yes





Review Of Systems


Except As Marked, All Systems Reviewed And Found Negative.


Constitutional: Negative for: Fever, Chills


Eyes: Negative for: Vision Change


Cardiovascular: Negative for: Chest Pain, Palpitations


Respiratory: Negative for: Shortness of Breath


Gastrointestinal: Negative for: Nausea, Vomiting


Neurological: Positive for: Headache.  Negative for: Weakness, Numbness, 

Dizziness





Physical Exam





- Physical Exam


Appears: Non-toxic, No Acute Distress


Skin: Normal Color, Warm, Dry


Head: Normacephalic, No Tenderness, No Swelling


Eye(s): bilateral: Normal Inspection, EOMI


Oral Mucosa: Moist


Neck: Normal ROM, No Midline Cervical Tenderness, No Paracervical Tenderness, 

Supple


Cardiovascular: Rhythm Regular


Respiratory: Normal Breath Sounds


Extremity: Normal ROM, No Deformity


Neurological/Psych: Oriented x3, Normal Speech, Normal Cognition, No Other (no 

focal deficits)


Gait: Steady





ED Course And Treatment


O2 Sat by Pulse Oximetry: 100 (RA)


Pulse Ox Interpretation: Normal





- CT Scan/US


  ** Head CT


Other Rad Studies (CT/US): Read By Radiologist, Radiology Report Reviewed


CT/US Interpretation: PROCEDURE:  CT scan brain dated 01/05/2018.  HISTORY:  

Status post fall with headache.  COMPARISON:  Comparison made with prior CT 

scan brain 03/20/2015.  TECHNIQUE:  Axial computed tomography images were 

obtained through the head/brain without intravenous contrast.  Radiation dose:  

Total exam DLP = 848.838 mGy-cm.  This CT exam was performed using one or more 

of the following dose reduction techniques: Automated exposure control, 

adjustment of the mA and/or kV according to patient size, and/or use of 

iterative reconstruction technique.  FINDINGS:  HEMORRHAGE:  No acute 

parenchymal, subarachnoid or extra-axial hemorrhage.  BRAIN:  Moderate to 

significant diffuse/ confluent chronic white matter ischemic changes again seen 

extending peripherally into the deep and subcortical white matter both cerebral 

hemispheres. There also appears to be some extension of these changes into the 

white matter tracts of both basal nuclei.  Few scattered chronic bilateral 

basal nuclei lacunar type infarcts also noted.  Moderate generalized volume 

loss.  Dense vascular calcifications both carotid siphons and vertebral 

arteries.  VENTRICLES:  No obstructive hydrocephalus.  CALVARIUM:  No acute 

calvarial fractures.  PARANASAL SINUSES:  Minor mucosal thickening seen within 

left chamber sphenoid sinus which is associate with a small elliptical shaped 

calcification that may represent an osteoma or bone island.  MASTOID AIR CELLS:

  Unremarkable as visualized. No inflammatory changes.  OTHER FINDINGS:  

Changes of bilateral cataract surgery.  IMPRESSION:  No acute intracranial 

hemorrhage.  Moderate to fairly significant diffuse/confluent chronic white 

matter ischemic changes extending peripherally into the deep and subcortical 

white matter both cerebral hemispheres.  There also appears to be some 

extension of these changes into the white matter tracts of both basal nuclei.  

Few scattered chronic bilateral basal nuclei lacunar type infarcts also noted.


Progress Note: Head CT ordered and reviewed. On reassessment, patient is 

resting comfortably, is tolerating PO, and pain has improved. Patient has no 

neurologic deficit, photophobia, rash, fever, or nuchal rigidity.  Patient was 

instructed to follow up with physician/clinic in 1-2 days.





Disposition


Counseled Patient/Family Regarding: Diagnosis, Need For Followup, Rx Given





- Disposition


Disposition: HOME/ ROUTINE


Disposition Time: 19:33


Condition: STABLE


Additional Instructions: 


Please follow up with PMD in 1-2 days





Tylenol for headache





Return to ER if severe headache, dizziness, vomiting, weakness, lethargy or 

worse 





Advised to stay with family member overnight for concussion precautions


Instructions:  Head Injury (ED)


Forms:  [facility] (English)





- Clinical Impression


Clinical Impression: 


 Head injury








- PA / NP / Resident Statement


MD/DO has reviewed & agrees with the documentation as recorded.





- Scribe Statement


The provider has reviewed the documentation as recorded by the Rupaibjennifer Levin





All medical record entries made by the Rupaibjennifer were at my direction and 

personally dictated by me. I have reviewed the chart and agree that the record 

accurately reflects my personal performance of the history, physical exam, 

medical decision making, and the department course for this patient. I have 

also personally directed, reviewed, and agree with the discharge instructions 

and disposition.

## 2018-01-29 ENCOUNTER — HOSPITAL ENCOUNTER (EMERGENCY)
Dept: HOSPITAL 31 - C.ER | Age: 78
Discharge: HOME | End: 2018-01-29
Payer: MEDICARE

## 2018-01-29 VITALS — TEMPERATURE: 99 F

## 2018-01-29 VITALS
OXYGEN SATURATION: 98 % | RESPIRATION RATE: 24 BRPM | HEART RATE: 98 BPM | DIASTOLIC BLOOD PRESSURE: 75 MMHG | SYSTOLIC BLOOD PRESSURE: 125 MMHG

## 2018-01-29 VITALS — BODY MASS INDEX: 28.3 KG/M2

## 2018-01-29 DIAGNOSIS — M54.2: Primary | ICD-10-CM

## 2018-01-29 DIAGNOSIS — I25.10: ICD-10-CM

## 2018-01-29 DIAGNOSIS — N18.6: ICD-10-CM

## 2018-01-29 DIAGNOSIS — I48.91: ICD-10-CM

## 2018-01-29 DIAGNOSIS — Z99.2: ICD-10-CM

## 2018-01-29 DIAGNOSIS — E03.9: ICD-10-CM

## 2018-01-29 DIAGNOSIS — I12.0: ICD-10-CM

## 2018-01-29 LAB
ALBUMIN SERPL-MCNC: 3.9 G/DL (ref 3.5–5)
ALBUMIN/GLOB SERPL: 1.2 {RATIO} (ref 1–2.1)
ALT SERPL-CCNC: 34 U/L (ref 9–52)
AST SERPL-CCNC: 31 U/L (ref 14–36)
BASOPHILS # BLD AUTO: 0.1 K/UL (ref 0–0.2)
BASOPHILS NFR BLD: 0.7 % (ref 0–2)
BUN SERPL-MCNC: 42 MG/DL (ref 7–17)
CALCIUM SERPL-MCNC: 7.9 MG/DL (ref 8.6–10.4)
EOSINOPHIL # BLD AUTO: 0.2 K/UL (ref 0–0.7)
EOSINOPHIL NFR BLD: 2 % (ref 0–4)
ERYTHROCYTE [DISTWIDTH] IN BLOOD BY AUTOMATED COUNT: 20.2 % (ref 11.5–14.5)
GFR NON-AFRICAN AMERICAN: 4
HGB BLD-MCNC: 11.2 G/DL (ref 11–16)
LYMPHOCYTES # BLD AUTO: 1 K/UL (ref 1–4.3)
LYMPHOCYTES NFR BLD AUTO: 12.6 % (ref 20–40)
MCH RBC QN AUTO: 25.4 PG (ref 27–31)
MCHC RBC AUTO-ENTMCNC: 32.5 G/DL (ref 33–37)
MCV RBC AUTO: 78.1 FL (ref 81–99)
MONOCYTES # BLD: 0.8 K/UL (ref 0–0.8)
MONOCYTES NFR BLD: 9.7 % (ref 0–10)
NEUTROPHILS # BLD: 6.2 K/UL (ref 1.8–7)
NEUTROPHILS NFR BLD AUTO: 75 % (ref 50–75)
NRBC BLD AUTO-RTO: 0.1 % (ref 0–2)
PLATELET # BLD: 243 K/UL (ref 130–400)
PMV BLD AUTO: 7 FL (ref 7.2–11.7)
RBC # BLD AUTO: 4.4 MIL/UL (ref 3.8–5.2)
TROPONIN I SERPL-MCNC: 0.06 NG/ML (ref 0–0.12)
WBC # BLD AUTO: 8.2 K/UL (ref 4.8–10.8)

## 2018-01-29 NOTE — C.PDOC
History Of Present Illness


Pt c/o left sided neck pain. Denies injury. 


Time Seen by Provider: 18 04:28


Chief Complaint (Nursing): Medical Clearance


History Per: Patient


Onset/Duration Of Symptoms: Hrs (since last night)


Current Symptoms Are (Timing): Still Present


Quality Of Discomfort: "Pain"


Severity: Moderate


Associated Symptoms: None


Exacerbating Factor(s): Turning, Movement


Additional History Per: Prior Records





Past Medical History


Reviewed: Historical Data, Nursing Documentation, Vital Signs


Vital Signs: 


 Last Vital Signs











Temp  99.0 F   18 04:22


 


Pulse  98 H  18 06:01


 


Resp  24   18 06:01


 


BP  125/75   18 06:01


 


Pulse Ox  98   18 06:11














- Medical History


PMH: Anemia, Arthritis (R HAND FX), Asthma, Atrial Fibrillation, CAD, Diabetes, 

Fractures (right wrist w/soft cast s/p fall 2 weeks ago), HTN, Hypothyroidism, 

End Stage Renal Disease (on dialysis M/W/F), Chronic Kidney Disease


Surgical History: Coronary Stent





- [facility] Procedures








ASPIRATION SKIN & SUBQ (14)


EXCISION OF RIGHT LOBE LIVER, PERCUTANEOUS APPROACH, DIAGN (16)


FLUOROSCOPY OF GALLBLADDER & BILE DUCT USING L OSM CONTRAST (16)


HEMODIALYSIS (03/20/15)


PERFORMANCE OF URINARY FILTRATION, MULTIPLE (17)


PERFORMANCE OF URINARY FILTRATION, SINGLE (10/03/16)


RESECTION OF GALLBLADDER, PERCUTANEOUS ENDOSCOPIC APPROACH (16)








Family History: States: Unknown Family Hx





- Social History


Hx Tobacco Use: No


Hx Alcohol Use: No


Hx Substance Use: No





- Immunization History


Hx Tetanus Toxoid Vaccination: No


Hx Influenza Vaccination: Yes


Hx Pneumococcal Vaccination: Yes





Review Of Systems


Except As Marked, All Systems Reviewed And Found Negative.


Constitutional: Negative for: Fever, Weakness


ENT: Negative for: Ear Pain, Ear Discharge, Throat Pain, Throat Swelling


Cardiovascular: Negative for: Chest Pain


Respiratory: Negative for: Shortness of Breath


Gastrointestinal: Negative for: Nausea, Vomiting, Abdominal Pain


Musculoskeletal: Positive for: Neck Pain.  Negative for: Arm Pain, Back Pain


Skin: Negative for: Rash


Neurological: Negative for: Weakness, Numbness, Headache





Physical Exam





- Physical Exam


Appears: No Acute Distress, Chronically Ill


Skin: Normal Color, Warm, Dry, No Rash


Head: Atraumatic, Normacephalic


Eye(s): bilateral: PERRL, EOMI


Ear(s): Bilateral: Normal


Throat: Normal


Neck: Normal ROM, No Midline Cervical Tenderness, No Step Off Deformity, Supple

, Other (tenderness along the left SCM muscle)


Lymphatic: No Adenopathy


Cardiovascular: Rhythm Regular


Respiratory: Normal Breath Sounds, No Accessory Muscle Use


Gastrointestinal/Abdominal: Soft, No Tenderness


Back: No CVA Tenderness, No Vertebral Tenderness


Extremity: Normal ROM


Pulses: Left Radial: Normal


Neurological/Psych: Oriented x3, Normal Speech, Normal Motor, Normal Sensation





ED Course And Treatment





- Laboratory Results


Result Diagrams: 


 18 04:48





 18 04:48


ECG: Interpreted By Me, Viewed By Me


ECG Rhythm: Sinus Rhythm, PVC, Nonspecific Changes


Rate From EC


O2 Sat by Pulse Oximetry: 98


Pulse Ox Interpretation: Normal





- CT Scan/US


  ** CT neck


Other Rad Studies (CT/US): Read By Radiologist, Radiology Report Reviewed


CT/US Interpretation: IMPRESSION:  No acute findings on this unenhanced CT scan 

of the neck.


Progress Note: Pt feels better. We contacted her dialysis center and they will 

dialyse her today. We arranged for transportation for her from here to ther 

dialysis center.


Reassessment Condition: Improved





Disposition


Counseled Patient/Family Regarding: Studies Performed, Diagnosis, Need For 

Followup





- Disposition


Disposition: HOME/ ROUTINE


Disposition Time: 06:17


Condition: IMPROVED


Additional Instructions: 


Follow up with your doctor within 2 days for further evaluation and treatment. 

Return to the ER if you develop fever, redness, swelling, weakness, numbness, 

worsening of symptoms or if you have any other concerns. 


Instructions:  Cervical Strain (DC)


Forms:  [facility] (English)





- Clinical Impression


Clinical Impression: 


 ESRD needing dialysis, Neck pain on left side

## 2018-01-29 NOTE — CT
EXAM:

  CT Neck Without Intravenous Contrast



CLINICAL HISTORY:

  77 years old, female; Pain; Neck pain; Additional info: Left lateral neck pain



TECHNIQUE:

  Axial computed tomography images of the neck without intravenous contrast.  

All CT scans at this facility use one or more dose reduction techniques, viz.: 

automated exposure control; ma/kV adjustment per patient size (including 

targeted exams where dose is matched to indication; i.e. head); or iterative 

reconstruction technique.  238 images are submitted.

  Coronal and sagittal reformatted images were created and reviewed.



COMPARISON:

  No relevant prior studies available.



FINDINGS:

  Nasopharynx:  Unremarkable.

  Oropharynx:  Unremarkable.  No significant tonsillar enlargement.

  Hypopharynx:  Unremarkable.

  Larynx:  Unremarkable.  Normal epiglottis.

  Trachea:  Unremarkable.

  Retropharyngeal space:  Unremarkable.

  Submandibular/parotid glands:  Fatty infiltration of parotid glands.

  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 

thyroid calcifications.  Correlation with thyroid function test and patient's 

clinical history is recommended.

  Bones/joints:  Multilevel degenerative disc disease.  There is 

anterolisthesis of C. lobe 4 over C5 measuring 3 mm.  There is retrolisthesis 

of C6 over C7 at measuring 4 mm.  No acute fracture.  Facet arthritis.

  Soft tissues:  Unremarkable.

  Vasculature:  The visualized portions of vein appears unremarkable.  Vascular 

calcifications.

  Lymph nodes:  Unremarkable.  No lymphadenopathy.

  Sinuses:  Patchy sphenoid sinus disease.

  Dental:  Edentulous maxilla and mandible.

  Lung apices:  The visualized portions of lung apices are clear.



IMPRESSION:     

  No acute findings on this unenhanced CT scan of the neck.

## 2018-02-02 ENCOUNTER — HOSPITAL ENCOUNTER (EMERGENCY)
Dept: HOSPITAL 31 - C.ER | Age: 78
Discharge: HOME | End: 2018-02-02
Payer: MEDICARE

## 2018-02-02 VITALS — SYSTOLIC BLOOD PRESSURE: 179 MMHG | RESPIRATION RATE: 18 BRPM | HEART RATE: 80 BPM | DIASTOLIC BLOOD PRESSURE: 78 MMHG

## 2018-02-02 VITALS — TEMPERATURE: 97.7 F | OXYGEN SATURATION: 100 %

## 2018-02-02 VITALS — BODY MASS INDEX: 28.3 KG/M2

## 2018-02-02 DIAGNOSIS — E11.22: ICD-10-CM

## 2018-02-02 DIAGNOSIS — N18.6: ICD-10-CM

## 2018-02-02 DIAGNOSIS — I12.0: ICD-10-CM

## 2018-02-02 DIAGNOSIS — R10.13: Primary | ICD-10-CM

## 2018-02-02 DIAGNOSIS — Z99.2: ICD-10-CM

## 2018-02-02 LAB
ALBUMIN SERPL-MCNC: 4.4 G/DL (ref 3.5–5)
ALBUMIN/GLOB SERPL: 1.1 {RATIO} (ref 1–2.1)
ALT SERPL-CCNC: 36 U/L (ref 9–52)
AST SERPL-CCNC: 40 U/L (ref 14–36)
BASOPHILS # BLD AUTO: 0 K/UL (ref 0–0.2)
BASOPHILS NFR BLD: 0.6 % (ref 0–2)
BUN SERPL-MCNC: 29 MG/DL (ref 7–17)
CALCIUM SERPL-MCNC: 8.7 MG/DL (ref 8.6–10.4)
EOSINOPHIL # BLD AUTO: 0 K/UL (ref 0–0.7)
EOSINOPHIL NFR BLD: 0.6 % (ref 0–4)
ERYTHROCYTE [DISTWIDTH] IN BLOOD BY AUTOMATED COUNT: 19.8 % (ref 11.5–14.5)
GFR NON-AFRICAN AMERICAN: 6
HGB BLD-MCNC: 12.1 G/DL (ref 11–16)
LIPASE: 85 U/L (ref 23–300)
LYMPHOCYTES # BLD AUTO: 0.9 K/UL (ref 1–4.3)
LYMPHOCYTES NFR BLD AUTO: 12.3 % (ref 20–40)
MCH RBC QN AUTO: 25.9 PG (ref 27–31)
MCHC RBC AUTO-ENTMCNC: 33.6 G/DL (ref 33–37)
MCV RBC AUTO: 77.1 FL (ref 81–99)
MONOCYTES # BLD: 0.7 K/UL (ref 0–0.8)
MONOCYTES NFR BLD: 9.1 % (ref 0–10)
NEUTROPHILS # BLD: 5.9 K/UL (ref 1.8–7)
NEUTROPHILS NFR BLD AUTO: 77.4 % (ref 50–75)
NRBC BLD AUTO-RTO: 0 % (ref 0–2)
PLATELET # BLD: 252 K/UL (ref 130–400)
PMV BLD AUTO: 7.9 FL (ref 7.2–11.7)
RBC # BLD AUTO: 4.67 MIL/UL (ref 3.8–5.2)
WBC # BLD AUTO: 7.7 K/UL (ref 4.8–10.8)

## 2018-02-02 PROCEDURE — 85025 COMPLETE CBC W/AUTO DIFF WBC: CPT

## 2018-02-02 PROCEDURE — 80053 COMPREHEN METABOLIC PANEL: CPT

## 2018-02-02 PROCEDURE — 83690 ASSAY OF LIPASE: CPT

## 2018-02-02 PROCEDURE — 96374 THER/PROPH/DIAG INJ IV PUSH: CPT

## 2018-02-02 PROCEDURE — 99284 EMERGENCY DEPT VISIT MOD MDM: CPT

## 2018-02-02 PROCEDURE — 96375 TX/PRO/DX INJ NEW DRUG ADDON: CPT

## 2018-02-02 PROCEDURE — 82948 REAGENT STRIP/BLOOD GLUCOSE: CPT

## 2018-02-02 RX ADMIN — PROCHLORPERAZINE MALEATE STA: 5 TABLET ORAL at 15:31

## 2018-02-02 RX ADMIN — PROCHLORPERAZINE MALEATE STA MG: 5 TABLET ORAL at 15:15

## 2018-02-02 NOTE — C.PDOC
History Of Present Illness


76 y/o female presents to the ER complaining of stomach pain which began today 

after she came back home from dialysis. Patient states that she had 2 episodes 

of vomiting today as she vomited cornbread. Patient states that she has some 

diarrhea. Patient does not have any other complaints.


Chief Complaint (Nursing): Abdominal Pain


History Per: Patient


History/Exam Limitations: no limitations


Onset/Duration Of Symptoms: Hrs


Current Symptoms Are (Timing): Still Present


Severity: Moderate





Past Medical History


Reviewed: Historical Data, Nursing Documentation, Vital Signs


Vital Signs: 


 Last Vital Signs











Temp  97.7 F   02/02/18 13:19


 


Pulse  74   02/02/18 17:15


 


Resp  20   02/02/18 17:15


 


BP  186/80 H  02/02/18 17:15


 


Pulse Ox  100   02/02/18 17:43














- Medical History


PMH: Anemia, Arthritis (R HAND FX), Asthma, Atrial Fibrillation, CAD, Diabetes, 

Fractures (right wrist w/soft cast s/p fall 2 weeks ago), HTN, Hyperlipidemia, 

Hypothyroidism, End Stage Renal Disease (on dialysis M/W/F), Chronic Kidney 

Disease


   Denies: Kidney Stones


Surgical History: Coronary Stent





- [facility] Procedures








ASPIRATION SKIN & SUBQ (05/24/14)


EXCISION OF RIGHT LOBE LIVER, PERCUTANEOUS APPROACH, DIAGN (05/02/16)


FLUOROSCOPY OF GALLBLADDER & BILE DUCT USING L OSM CONTRAST (05/02/16)


HEMODIALYSIS (03/20/15)


PERFORMANCE OF URINARY FILTRATION, MULTIPLE (06/26/17)


PERFORMANCE OF URINARY FILTRATION, SINGLE (10/03/16)


RESECTION OF GALLBLADDER, PERCUTANEOUS ENDOSCOPIC APPROACH (05/02/16)








Family History: States: No Known Family Hx





- Social History


Hx Tobacco Use: No


Hx Alcohol Use: No


Hx Substance Use: No





- Immunization History


Hx Tetanus Toxoid Vaccination: No


Hx Influenza Vaccination: Yes


Hx Pneumococcal Vaccination: Yes





Review Of Systems


Except As Marked, All Systems Reviewed And Found Negative.


Constitutional: Negative for: Fever, Chills


Gastrointestinal: Positive for: Vomiting, Abdominal Pain, Diarrhea.  Negative 

for: Nausea





Physical Exam





- Physical Exam


Appears: Non-toxic, No Acute Distress


Skin: Normal Color, Warm


Head: Atraumatic, Normacephalic


Eye(s): bilateral: Normal Inspection


Nose: Normal


Oral Mucosa: Moist


Neck: Supple


Chest: Symmetrical


Cardiovascular: Rhythm Regular


Respiratory: Normal Breath Sounds, No Accessory Muscle Use, No Rales, No Rhonchi

, No Wheezing


Gastrointestinal/Abdominal: Normal Exam, Soft, Tenderness (epigastric tenderness

), Guarding, No Rebound, Other (midline laparoscopy scar)


Extremity: Normal ROM, Pedal Edema (2+ bilateral pedal edema)


Neurological/Psych: Oriented x3, Normal Speech, Normal Motor, Normal Sensation





ED Course And Treatment





- Laboratory Results


Result Diagrams: 


 02/02/18 14:01





 02/02/18 14:01


O2 Sat by Pulse Oximetry: 100 (RA)


Pulse Ox Interpretation: Normal





Medical Decision Making


Medical Decision Making: 


Plan:


--Labs


--Zofran





Disposition





- Disposition


Disposition: HOME/ ROUTINE


Disposition Time: 17:37


Condition: FAIR


Additional Instructions: 


Take antacids as prescribed.Return for any worsening of symptoms


Instructions:  Epigastric Pain (ED)


Forms:  [facility] (English)





- Clinical Impression


Clinical Impression: 


 Epigastric abdominal pain








- Scribe Statement


The provider has reviewed the documentation as recorded by the Darian Parmar


Provider Attestation: 





All medical record entries made by the Darian were at my direction and 

personally dictated by me. I have reviewed the chart and agree that the record 

accurately reflects my personal performance of the history, physical exam, 

medical decision making, and the department course for this patient. I have 

also personally directed, reviewed, and agree with the discharge instructions 

and disposition.

## 2018-04-18 ENCOUNTER — HOSPITAL ENCOUNTER (EMERGENCY)
Dept: HOSPITAL 31 - C.ER | Age: 78
Discharge: HOME | End: 2018-04-18
Payer: MEDICARE

## 2018-04-18 VITALS
SYSTOLIC BLOOD PRESSURE: 165 MMHG | HEART RATE: 67 BPM | OXYGEN SATURATION: 96 % | RESPIRATION RATE: 18 BRPM | DIASTOLIC BLOOD PRESSURE: 66 MMHG | TEMPERATURE: 98.5 F

## 2018-04-18 VITALS — BODY MASS INDEX: 28.3 KG/M2

## 2018-04-18 DIAGNOSIS — R73.09: Primary | ICD-10-CM

## 2018-04-18 NOTE — C.PDOC
History Of Present Illness


77 y/o F c PMHx DM p/w low glucometer reading at home. Patient states 

glucometer read 22. She denies any symptoms including lethargy, drowsiness, 

dyspnea, palpitations, diaphoresis, confusion. She denies ingesting anything 

whatsoever prior to coming to ED. Fingerstick here 200+, which patient states 

is normal for her. She states home glucomter reading must have been in error 

and feels comfortable to go home.


Time Seen by Provider: 04/18/18 17:15


Chief Complaint (Nursing): Medical Clearance





Past Medical History


Vital Signs: 


 Last Vital Signs











Temp  98.5 F   04/18/18 17:05


 


Pulse  67   04/18/18 17:05


 


Resp  18   04/18/18 17:05


 


BP  165/66 H  04/18/18 17:05


 


Pulse Ox  96   04/18/18 17:19














- Medical History


PMH: Anemia, Arthritis (R HAND FX), Asthma, Atrial Fibrillation, CAD, Diabetes, 

Fractures (right wrist w/soft cast s/p fall 2 weeks ago), HTN, Hyperlipidemia, 

Hypothyroidism, End Stage Renal Disease (on dialysis M/W/F), Chronic Kidney 

Disease


   Denies: Kidney Stones


Surgical History: Coronary Stent





- [facility] Procedures








ASPIRATION SKIN & SUBQ (05/24/14)


EXCISION OF RIGHT LOBE LIVER, PERCUTANEOUS APPROACH, DIAGN (05/02/16)


FLUOROSCOPY OF GALLBLADDER & BILE DUCT USING L OSM CONTRAST (05/02/16)


HEMODIALYSIS (03/20/15)


PERFORMANCE OF URINARY FILTRATION, MULTIPLE (06/26/17)


PERFORMANCE OF URINARY FILTRATION, SINGLE (10/03/16)


RESECTION OF GALLBLADDER, PERCUTANEOUS ENDOSCOPIC APPROACH (05/02/16)








Family History: States: Unknown Family Hx





- Social History


Hx Tobacco Use: No


Hx Alcohol Use: No


Hx Substance Use: No





- Immunization History


Hx Tetanus Toxoid Vaccination: No


Hx Influenza Vaccination: Yes


Hx Pneumococcal Vaccination: Yes





Review Of Systems


Except As Marked, All Systems Reviewed And Found Negative.


Cardiovascular: Negative for: Palpitations


Respiratory: Negative for: Shortness of Breath





Physical Exam





- Physical Exam


Appears: Non-toxic, No Acute Distress


Skin: No Rash


Head: Normacephalic


Eye(s): bilateral: PERRL


Oral Mucosa: Moist


Cardiovascular: Rhythm Regular


Respiratory: Normal Breath Sounds


Gastrointestinal/Abdominal: Soft


Extremity: No Swelling


Pulses: Left Radial: Normal, Right Radial: Normal


Neurological/Psych: Normal Speech, Normal Cognition





ED Course And Treatment


O2 Sat by Pulse Oximetry: 96





Disposition





- Disposition


Referrals: 


Moise Wang MD [Staff Provider] - 


Disposition: HOME/ ROUTINE


Disposition Time: 17:17


Condition: STABLE


Instructions:  Low Blood Sugar, Adult (DC)


Forms:  [facility] (English)





- Clinical Impression


Clinical Impression: 


 Abnormal glucose measurement

## 2018-10-01 ENCOUNTER — HOSPITAL ENCOUNTER (OUTPATIENT)
Dept: HOSPITAL 31 - C.ER | Age: 78
Setting detail: OBSERVATION
LOS: 1 days | Discharge: HOME | End: 2018-10-02
Attending: INTERNAL MEDICINE | Admitting: INTERNAL MEDICINE
Payer: MEDICARE

## 2018-10-01 VITALS — RESPIRATION RATE: 20 BRPM

## 2018-10-01 VITALS — BODY MASS INDEX: 28.3 KG/M2

## 2018-10-01 DIAGNOSIS — I48.91: ICD-10-CM

## 2018-10-01 DIAGNOSIS — Z95.5: ICD-10-CM

## 2018-10-01 DIAGNOSIS — J45.909: ICD-10-CM

## 2018-10-01 DIAGNOSIS — E11.649: ICD-10-CM

## 2018-10-01 DIAGNOSIS — E78.5: ICD-10-CM

## 2018-10-01 DIAGNOSIS — Z99.2: ICD-10-CM

## 2018-10-01 DIAGNOSIS — Z91.81: ICD-10-CM

## 2018-10-01 DIAGNOSIS — Z79.4: ICD-10-CM

## 2018-10-01 DIAGNOSIS — I25.10: ICD-10-CM

## 2018-10-01 DIAGNOSIS — E03.9: ICD-10-CM

## 2018-10-01 DIAGNOSIS — D50.9: ICD-10-CM

## 2018-10-01 DIAGNOSIS — M19.041: ICD-10-CM

## 2018-10-01 DIAGNOSIS — E11.22: Primary | ICD-10-CM

## 2018-10-01 DIAGNOSIS — N18.6: ICD-10-CM

## 2018-10-01 DIAGNOSIS — I12.0: ICD-10-CM

## 2018-10-01 LAB
ALBUMIN SERPL-MCNC: 4.1 G/DL (ref 3.5–5)
ALBUMIN/GLOB SERPL: 1.1 {RATIO} (ref 1–2.1)
ALT SERPL-CCNC: 19 U/L (ref 9–52)
AST SERPL-CCNC: 35 U/L (ref 14–36)
BASOPHILS # BLD AUTO: 0.1 K/UL (ref 0–0.2)
BASOPHILS NFR BLD: 1.7 % (ref 0–2)
BUN SERPL-MCNC: 29 MG/DL (ref 7–17)
CALCIUM SERPL-MCNC: 8.3 MG/DL (ref 8.6–10.4)
EOSINOPHIL # BLD AUTO: 0.5 K/UL (ref 0–0.7)
EOSINOPHIL NFR BLD: 7.2 % (ref 0–4)
ERYTHROCYTE [DISTWIDTH] IN BLOOD BY AUTOMATED COUNT: 15.8 % (ref 11.5–14.5)
GFR NON-AFRICAN AMERICAN: 9
HGB BLD-MCNC: 12.4 G/DL (ref 11–16)
LYMPHOCYTES # BLD AUTO: 1.2 K/UL (ref 1–4.3)
LYMPHOCYTES NFR BLD AUTO: 16.8 % (ref 20–40)
MCH RBC QN AUTO: 26.9 PG (ref 27–31)
MCHC RBC AUTO-ENTMCNC: 32.8 G/DL (ref 33–37)
MCV RBC AUTO: 82 FL (ref 81–99)
MONOCYTES # BLD: 0.8 K/UL (ref 0–0.8)
MONOCYTES NFR BLD: 11.5 % (ref 0–10)
NEUTROPHILS # BLD: 4.6 K/UL (ref 1.8–7)
NEUTROPHILS NFR BLD AUTO: 62.8 % (ref 50–75)
NRBC BLD AUTO-RTO: 0.1 % (ref 0–2)
PLATELET # BLD: 244 K/UL (ref 130–400)
PMV BLD AUTO: 8.3 FL (ref 7.2–11.7)
RBC # BLD AUTO: 4.61 MIL/UL (ref 3.8–5.2)
TROPONIN I SERPL-MCNC: 0.05 NG/ML (ref 0–0.12)
WBC # BLD AUTO: 7.3 K/UL (ref 4.8–10.8)

## 2018-10-01 PROCEDURE — 71045 X-RAY EXAM CHEST 1 VIEW: CPT

## 2018-10-01 PROCEDURE — 99285 EMERGENCY DEPT VISIT HI MDM: CPT

## 2018-10-01 PROCEDURE — 84484 ASSAY OF TROPONIN QUANT: CPT

## 2018-10-01 PROCEDURE — 82948 REAGENT STRIP/BLOOD GLUCOSE: CPT

## 2018-10-01 PROCEDURE — 85025 COMPLETE CBC W/AUTO DIFF WBC: CPT

## 2018-10-01 PROCEDURE — 93005 ELECTROCARDIOGRAM TRACING: CPT

## 2018-10-01 PROCEDURE — 80053 COMPREHEN METABOLIC PANEL: CPT

## 2018-10-01 NOTE — C.PDOC
History Of Present Illness


77 y/o female with PMHx of insulin-dependent diabetes, hypertension, A Fib, and 

end stage renal disease (on dialysis M-W-F), brought in by ambulance for 

evaluation s/p hypoglycemic episode. As per family, patient was talking on the p

gibran with her daughter today and began not making sense so 911 was called. EMS

found blood sugar to be 26, 1 amp of D50 given in the field. On arrival patient 

is AAOx3, speaking in full sentences, offering no physical complaints. Reports 

she was given food on arrival and feels fine. Patient states she took all 

medications as prescribed, and completed dialysis session earlier today without 

complication. Son at bedside states patient has been having similar episodes 

recently, as patient lives on her own and sometimes only eats a biscuit prior to

taking her insulin. 





Time Seen by Provider: 10/01/18 19:30


Chief Complaint (Nursing): High Blood Sugar


History Per: Patient


History/Exam Limitations: no limitations


Onset/Duration Of Symptoms: Mins


Current Symptoms Are (Timing): Gone


Current Diabetic Medications: Insulin


Treatment Prior To Provider Evaluation: D50W Given


Response To Treatment: Good Response


Additional History Per: Family (son at bedside)





Past Medical History


Reviewed: Historical Data, Nursing Documentation, Vital Signs


Vital Signs: 





                                Last Vital Signs











Temp  97.5 F L  10/01/18 17:45


 


Pulse  63   10/01/18 19:01


 


Resp  18   10/01/18 19:01


 


BP  162/58 H  10/01/18 19:01


 


Pulse Ox  100   10/01/18 19:01














- Medical History


PMH: Anemia, Arthritis (R HAND FX), Asthma, Atrial Fibrillation, CAD, Diabetes, 

Fractures (right wrist w/soft cast s/p fall 2 weeks ago), HTN, Hyperlipidemia, 

Hypothyroidism, End Stage Renal Disease (on dialysis M/W/F), Chronic Kidney 

Disease


   Denies: Kidney Stones


Surgical History: Coronary Stent





- [facility] Procedures











ASPIRATION SKIN & SUBQ (14)


EXCISION OF RIGHT LOBE LIVER, PERCUTANEOUS APPROACH, DIAGN (16)


FLUOROSCOPY OF GALLBLADDER & BILE DUCT USING L OSM CONTRAST (16)


HEMODIALYSIS (03/20/15)


PERFORMANCE OF URINARY FILTRATION, MULTIPLE (17)


PERFORMANCE OF URINARY FILTRATION, SINGLE (10/03/16)


RESECTION OF GALLBLADDER, PERCUTANEOUS ENDOSCOPIC APPROACH (16)








Family History: States: Unknown Family Hx





- Social History


Hx Tobacco Use: No


Hx Alcohol Use: No


Hx Substance Use: No





- Immunization History


Hx Tetanus Toxoid Vaccination: No


Hx Influenza Vaccination: Yes


Hx Pneumococcal Vaccination: Yes





Review Of Systems


Except As Marked, All Systems Reviewed And Found Negative.


Constitutional: Positive for: Other (Hypoglycemic episode).  Negative for: 

Fever, Chills


Eyes: Negative for: Vision Change


ENT: Negative for: Nose Congestion


Cardiovascular: Negative for: Chest Pain


Respiratory: Negative for: Cough, Shortness of Breath


Gastrointestinal: Negative for: Nausea, Vomiting, Abdominal Pain


Neurological: Negative for: Weakness, Numbness, Change in Speech, Altered Mental

Status, Headache, Dizziness





Physical Exam





- Physical Exam


Appears: Non-toxic, No Acute Distress


Skin: Warm, Dry


Head: Atraumatic, Normacephalic


Eye(s): bilateral: Normal Inspection, PERRL, EOMI


Oral Mucosa: Moist


Neck: Normal ROM


Chest: Symmetrical


Cardiovascular: Rhythm Regular


Respiratory: Normal Breath Sounds, No Rales, No Rhonchi, No Wheezing


Gastrointestinal/Abdominal: Soft, No Tenderness, No Distention


Extremity: Normal ROM, Capillary Refill (less than 2 sec), Swelling (Bilateral 

1+ pitting edema), Other (RUE with AV fistula)


Neurological/Psych: Oriented x3, Normal Speech, Normal Cognition, Normal Cranial

Nerves, Normal Motor, Normal Sensation


Gait: Steady





ED Course And Treatment





- Laboratory Results


Result Diagrams: 


                                 10/01/18 19:29





                                 10/01/18 19:29


ECG: Interpreted By Me, Viewed By Me


Interpretation Of ECG: bigeminy, left axis deviation


Rate From EC (bpm)


O2 Sat by Pulse Oximetry: 100 (NC)


Pulse Ox Interpretation: Normal





Medical Decision Making


Medical Decision Making: 





Time: 19:35





Initial Impression: Hypoglycemic episode





Initial Plan:


--CMP


--troponin I


--CBC


--Chest x-ray


--EKG





20:15  Discussed with Dr. Wang, patient accepted for admission.











Disposition


Discussed With : Moise Wang


Doctor Will See Patient In The: Hospital


Counseled Patient/Family Regarding: Studies Performed, Diagnosis





- Disposition


Disposition: HOSPITALIZED


Disposition Time: 20:15


Condition: IMPROVED





- POA


Present On Arrival: Poor Glycemic Control





- Clinical Impression


Clinical Impression: 


 Hypoglycemia








- Scribe Statement


The provider has reviewed the documentation as recorded by the Scribe (mAinah Gallegos)


Provider Attestation: 





All medical record entries made by the Scribe were at my direction and 

personally dictated by me. I have reviewed the chart and agree that the record 

accurately reflects my personal performance of the history, physical exam, 

medical decision making, and the department course for this patient. I have also

personally directed, reviewed, and agree with the discharge instructions and 

disposition.

## 2018-10-02 VITALS — SYSTOLIC BLOOD PRESSURE: 163 MMHG | DIASTOLIC BLOOD PRESSURE: 67 MMHG | HEART RATE: 71 BPM | TEMPERATURE: 97.3 F

## 2018-10-02 RX ADMIN — INSULIN ASPART SCH: 100 INJECTION, SOLUTION INTRAVENOUS; SUBCUTANEOUS at 00:45

## 2018-10-02 RX ADMIN — INSULIN ASPART SCH UNIT: 100 INJECTION, SOLUTION INTRAVENOUS; SUBCUTANEOUS at 08:39

## 2018-10-02 RX ADMIN — INSULIN ASPART SCH UNIT: 100 INJECTION, SOLUTION INTRAVENOUS; SUBCUTANEOUS at 12:13

## 2018-10-02 RX ADMIN — INSULIN ASPART SCH: 100 INJECTION, SOLUTION INTRAVENOUS; SUBCUTANEOUS at 04:27

## 2018-10-02 NOTE — RAD
Chest x-ray single frontal view 



HISTORY:

Infiltrate. 



COMPARISON:

06/26/2017 



Findings: 



Venous congestion. 



Right hilar prominence. 



Mild patchy increased markings at the left lung base. 



Biapical pleural thickening with upper lobe granulomatous changes. 



Top normal heart size. 



Degenerative changes in the spine and shoulders. 



Impression: 



Venous congestion. 



Right hilar prominence. 



Mild patchy increased markings at the left lung base. 



Biapical pleural thickening with upper lobe granulomatous changes.

## 2018-10-02 NOTE — CP.PCM.PN
Subjective





- Date & Time of Evaluation


Date of Evaluation: 10/02/18


Time of Evaluation: 14:25





- Subjective


Subjective: 





Patient has no complaint, eating well. Serial blood glucose has been OK. To 

discharge home today. Discontinue Lantus. Try Januvia 25 mg PO qd. Resume all 

home meds, except Lantus. HD in AM.





Objective





- Vital Signs/Intake and Output


Vital Signs (last 24 hours): 


                                        











Temp Pulse Resp BP Pulse Ox


 


 97.3 F L  71   20   163/67 H  95 


 


 10/02/18 08:10  10/02/18 08:10  10/02/18 08:10  10/02/18 08:10  10/02/18 08:10











- Medications


Medications: 


                               Current Medications





Amlodipine Besylate (Norvasc)  10 mg PO DAILY [facility]


   Last Admin: 10/02/18 09:43 Dose:  10 mg


Aspirin (Ecotrin)  81 mg PO DAILY [facility]


   Last Admin: 10/02/18 09:43 Dose:  81 mg


Calcium Acetate (Phoslo)  667 mg PO BIDCC [facility]


   Last Admin: 10/02/18 08:42 Dose:  667 mg


Carvedilol (Coreg)  6.25 mg PO BID [facility]


   Last Admin: 10/02/18 09:43 Dose:  6.25 mg


Cinacalcet (Sensipar)  30 mg PO DAILY [facility]


   Last Admin: 10/02/18 10:53 Dose:  30 mg


Clonidine HCl (Catapres)  0.2 mg PO BID [facility]


   Last Admin: 10/02/18 09:43 Dose:  0.2 mg


Dextrose (Dextrose 50% Inj)  0 ml IV STAT PRN; Protocol


   PRN Reason: Hypoglycemia Protocol


Dextrose (Glutose 15)  0 gm PO ONCE PRN; Protocol


   PRN Reason: Hypoglycemia Protocol


Glucagon (Glucagen Diagnostic Kit)  0 mg IM STAT PRN; Protocol


   PRN Reason: Hypoglycemia Protocol


Heparin Sodium (Porcine) (Heparin)  5,000 units SC Q8 [facility]


   Last Admin: 10/02/18 13:57 Dose:  5,000 units


Dextrose (Dextrose 5% In Water 1000 Ml)  1,000 mls @ 40 mls/hr IV .Q24H [facility]


   Last Admin: 10/01/18 22:39 Dose:  40 mls/hr


Dextrose (Dextrose 5% In Water 1000 Ml)  1,000 mls @ 0 mls/hr IV .Q0M PRN; 

Protocol


   PRN Reason: Hypoglycemia Protocol


Influenza Virus Vaccine (Fluzone Quad 4428-0125)  60 mcg IM .ONCE ONE


   Stop: 10/04/18 10:01


Insulin Aspart (Novolog)  0 unit SC Q4 [facility]; Protocol


   Last Admin: 10/02/18 12:13 Dose:  3 unit


Losartan Potassium (Cozaar)  50 mg PO DAILY [facility]


   Last Admin: 10/02/18 12:13 Dose:  50 mg


Oxcarbazepine (Trileptal)  150 mg PO BID [facility]


   Last Admin: 10/02/18 09:43 Dose:  150 mg


Pantoprazole Sodium (Protonix Ec Tab)  40 mg PO DAILY [facility]


   Last Admin: 10/02/18 09:43 Dose:  40 mg


Pneumococcal Polyvalent Vaccine (Pneumovax 23 Vaccine)  0.5 ml IM .ONCE ONE


   Stop: 10/04/18 10:01


Rosuvastatin Calcium (Crestor)  10 mg PO HS [facility]


   Last Admin: 10/01/18 22:41 Dose:  10 mg











- Labs


Labs: 


                                        





                                 10/01/18 19:29 





                                 10/01/18 19:29 











- Constitutional


Appears: Well, No Acute Distress





- Head Exam


Head Exam: NORMAL INSPECTION





- Eye Exam


Eye Exam: Normal appearance


Pupil Exam: NORMAL ACCOMODATION





- ENT Exam


ENT Exam: Normal Exam





- Neck Exam


Neck Exam: Normal Inspection





- Respiratory Exam


Respiratory Exam: Clear to Ausculation Bilateral, NORMAL BREATHING PATTERN





- Cardiovascular Exam


Cardiovascular Exam: REGULAR RHYTHM





- GI/Abdominal Exam


GI & Abdominal Exam: Soft, Normal Bowel Sounds





- Rectal Exam


Rectal Exam: Deferred





- Extremities Exam


Extremities Exam: Normal Inspection





- Back Exam


Back Exam: NORMAL INSPECTION





- Neurological Exam


Neurological Exam: Alert, Awake, Oriented x3





- Psychiatric Exam


Psychiatric exam: Anxious





- Skin


Skin Exam: Dry, Intact, Normal Color, Warm





Assessment and Plan


(1) Hypoglycemia


Assessment & Plan: 


Discontinue Lantus. Try Januvia 25 mg PO qd.


Status: Resolved   





(2) CAD (coronary artery disease)


Status: Chronic   





(3) Hypothyroidism


Status: Chronic

## 2018-10-02 NOTE — CP.PCM.HP
History of Present Illness





- History of Present Illness


History of Present Illness: 





78 years old female suddenly became confused while on the phone with her 

daughter. (11 was called, and the EMS found her glucose to be 29 . She received 

one ampule od D50 IV and recovered her full normal mental status. She states 

that she was eating normally and took her regular Lantus 6 units s/c the night 

previously. Her blood glucose has been good since her hospitalization, and she 

is eating normally. [facility]





Present on Admission





- Present on Admission


Any Indicators Present on Admission: No





Review of Systems





- Neurological


Neurological: Confusion





Past Patient History





- Infectious Disease


Hx of Infectious Diseases: None





- Tetanus Immunizations


Tetanus Immunization: Unknown





- Past Medical History & Family History


Past Medical History?: Yes





- Past Social History


Smoking Status: Never Smoked


Chewing Tobacco Use: No


Cigar Use: No


Alcohol: None


Drugs: Denies


Home Situation {Lives}: Alone





- CARDIAC


Hx Atrial Fibrillation: Yes


Hx Hypertension: Yes





- PULMONARY


Hx Asthma: Yes





- NEUROLOGICAL


Hx Neurological Disorder: No





- HEENT


Hx HEENT Problems: Yes


Other/Comment: wear eyeglasses





- RENAL


Hx Chronic Kidney Disease: Yes


Hx Kidney Stones: No





- ENDOCRINE/METABOLIC


Hx Hypothyroidism: Yes





- HEMATOLOGICAL/ONCOLOGICAL


Hx Anemia: Yes





- INTEGUMENTARY


Hx Dermatological Problems: No





- MUSCULOSKELETAL/RHEUMATOLOGICAL


Hx Falls: Yes





- GASTROINTESTINAL


Hx Gastrointestinal Disorders: Yes


Hx Constipation: Yes





- GENITOURINARY/GYNECOLOGICAL


Hx Genitourinary Disorders: No





- PSYCHIATRIC


Hx Substance Use: No





- SURGICAL HISTORY


Hx Coronary Stent: Yes





- ANESTHESIA


Hx Anesthesia: Yes


Hx Anesthesia Reactions: No


Hx Malignant Hyperthermia: No





Meds


Allergies/Adverse Reactions: 


                                    Allergies











Allergy/AdvReac Type Severity Reaction Status Date / Time


 


No Known Allergies Allergy   Verified 04/18/18 17:08














Physical Exam





- Constitutional


Appears: Well, No Acute Distress





- Head Exam


Head Exam: NORMAL INSPECTION





- Eye Exam


Eye Exam: Normal appearance


Pupil Exam: NORMAL ACCOMODATION





- ENT Exam


ENT Exam: Normal Exam





- Neck Exam


Neck exam: Positive for: Normal Inspection





- Respiratory Exam


Respiratory Exam: Clear to Auscultation Bilateral, NORMAL BREATHING PATTERN





- Cardiovascular Exam


Cardiovascular Exam: REGULAR RHYTHM, Systolic Murmur





- GI/Abdominal Exam


GI & Abdominal Exam: Normal Bowel Sounds, Soft





- Rectal Exam


Rectal Exam: Deferred





-  Exam


 Exam: NORMAL INSPECTION





- Extremities Exam


Extremities exam: Positive for: normal inspection





- Back Exam


Back exam: NORMAL INSPECTION





- Neurological Exam


Neurological exam: Alert, CN II-XII Intact, Oriented x3





- Psychiatric Exam


Psychiatric exam: Anxious





- Skin


Skin Exam: Dry, Intact, Normal Color, Warm





Results





- Vital Signs


Recent Vital Signs: 





                                Last Vital Signs











Temp  97.3 F L  10/02/18 08:10


 


Pulse  71   10/02/18 08:10


 


Resp  20   10/02/18 08:10


 


BP  163/67 H  10/02/18 08:10


 


Pulse Ox  95   10/02/18 08:10














- Labs


Result Diagrams: 


                                 10/01/18 19:29





                                 10/01/18 19:29


Labs: 





                         Laboratory Results - last 24 hr











  10/01/18 10/01/18 10/01/18





  17:31 18:03 18:58


 


WBC   


 


RBC   


 


Hgb   


 


Hct   


 


MCV   


 


MCH   


 


MCHC   


 


RDW   


 


Plt Count   


 


MPV   


 


Neut % (Auto)   


 


Lymph % (Auto)   


 


Mono % (Auto)   


 


Eos % (Auto)   


 


Baso % (Auto)   


 


Neut # (Auto)   


 


Lymph # (Auto)   


 


Mono # (Auto)   


 


Eos # (Auto)   


 


Baso # (Auto)   


 


Sodium   


 


Potassium   


 


Chloride   


 


Carbon Dioxide   


 


Anion Gap   


 


BUN   


 


Creatinine   


 


Est GFR ( Amer)   


 


Est GFR (Non-Af Amer)   


 


POC Glucose (mg/dL)  113 H  71  114 H


 


Random Glucose   


 


Calcium   


 


Total Bilirubin   


 


AST   


 


ALT   


 


Alkaline Phosphatase   


 


Troponin I   


 


Total Protein   


 


Albumin   


 


Globulin   


 


Albumin/Globulin Ratio   














  10/01/18 10/01/18 10/01/18





  19:29 19:29 19:33


 


WBC  7.3  


 


RBC  4.61  


 


Hgb  12.4  


 


Hct  37.8  


 


MCV  82.0  D  


 


MCH  26.9 L  


 


MCHC  32.8 L  


 


RDW  15.8 H  


 


Plt Count  244  


 


MPV  8.3  


 


Neut % (Auto)  62.8  


 


Lymph % (Auto)  16.8 L  


 


Mono % (Auto)  11.5 H  


 


Eos % (Auto)  7.2 H  


 


Baso % (Auto)  1.7  


 


Neut # (Auto)  4.6  


 


Lymph # (Auto)  1.2  


 


Mono # (Auto)  0.8  


 


Eos # (Auto)  0.5  


 


Baso # (Auto)  0.1  


 


Sodium   133 


 


Potassium   3.9 


 


Chloride   86 L 


 


Carbon Dioxide   34 H 


 


Anion Gap   17 


 


BUN   29 H 


 


Creatinine   4.8 H 


 


Est GFR ( Amer)   11 


 


Est GFR (Non-Af Amer)   9 


 


POC Glucose (mg/dL)    134 H


 


Random Glucose   159 H 


 


Calcium   8.3 L 


 


Total Bilirubin   0.6 


 


AST   35 


 


ALT   19 


 


Alkaline Phosphatase   171 H 


 


Troponin I   0.0500 


 


Total Protein   7.9 


 


Albumin   4.1 


 


Globulin   3.8 


 


Albumin/Globulin Ratio   1.1 














  10/02/18 10/02/18 10/02/18





  00:50 04:01 07:12


 


WBC   


 


RBC   


 


Hgb   


 


Hct   


 


MCV   


 


MCH   


 


MCHC   


 


RDW   


 


Plt Count   


 


MPV   


 


Neut % (Auto)   


 


Lymph % (Auto)   


 


Mono % (Auto)   


 


Eos % (Auto)   


 


Baso % (Auto)   


 


Neut # (Auto)   


 


Lymph # (Auto)   


 


Mono # (Auto)   


 


Eos # (Auto)   


 


Baso # (Auto)   


 


Sodium   


 


Potassium   


 


Chloride   


 


Carbon Dioxide   


 


Anion Gap   


 


BUN   


 


Creatinine   


 


Est GFR ( Amer)   


 


Est GFR (Non-Af Amer)   


 


POC Glucose (mg/dL)  183 H  161 H  154 H


 


Random Glucose   


 


Calcium   


 


Total Bilirubin   


 


AST   


 


ALT   


 


Alkaline Phosphatase   


 


Troponin I   


 


Total Protein   


 


Albumin   


 


Globulin   


 


Albumin/Globulin Ratio   














  10/02/18





  11:05


 


WBC 


 


RBC 


 


Hgb 


 


Hct 


 


MCV 


 


MCH 


 


MCHC 


 


RDW 


 


Plt Count 


 


MPV 


 


Neut % (Auto) 


 


Lymph % (Auto) 


 


Mono % (Auto) 


 


Eos % (Auto) 


 


Baso % (Auto) 


 


Neut # (Auto) 


 


Lymph # (Auto) 


 


Mono # (Auto) 


 


Eos # (Auto) 


 


Baso # (Auto) 


 


Sodium 


 


Potassium 


 


Chloride 


 


Carbon Dioxide 


 


Anion Gap 


 


BUN 


 


Creatinine 


 


Est GFR ( Amer) 


 


Est GFR (Non-Af Amer) 


 


POC Glucose (mg/dL)  279 H


 


Random Glucose 


 


Calcium 


 


Total Bilirubin 


 


AST 


 


ALT 


 


Alkaline Phosphatase 


 


Troponin I 


 


Total Protein 


 


Albumin 


 


Globulin 


 


Albumin/Globulin Ratio 














Assessment & Plan


(1) Hypoglycemia


Status: Resolved   





(2) CAD (coronary artery disease)


Status: Chronic   





(3) Hypothyroidism


Status: Chronic   





Decision To Admit





- Pt Status Changed To:


Hospital Disposition Of: Observation





- .


Bed Request Type: Regular


Admitting Physician: Moise Wang

## 2018-10-03 VITALS — OXYGEN SATURATION: 100 %

## 2018-10-03 NOTE — CARD
--------------- APPROVED REPORT --------------





Date of service: 10/01/2018



EKG Measurement

Heart [id number]

MI [id number]

[id number]

[id number]

[id number]



<Conclusion>

Sinus rhythm with frequent premature ventricular complexes in a 

pattern of bigeminy

Left axis deviation

Anterior infarct, age undetermined

Prolonged QT

Abnormal ECG

## 2019-07-04 NOTE — C.PDOC
History Of Present Illness


The patient presents to the ED for evaluation of abdominal pain and 

constipation which began around 2 days ago. Patient states she has not had a 

bowel movement in 2 days. Patient has history of ESRD and attends [facility] 

dialysis. Patient states she has not attended dialysis because of her abdominal 

pain. Patient denies fever, chills, nausea, vomiting. 


Time Seen by Provider: 09/20/17 05:24


Chief Complaint (Nursing): Abdominal Pain


History Per: Patient


History/Exam Limitations: no limitations


Onset/Duration Of Symptoms: Days (2)


Current Symptoms Are (Timing): Still Present


Severity: Mild


Pain Scale Rating Of: 3


Location Of Pain/Discomfort: Diffuse


Radiation Of Pain To:: None


Quality Of Discomfort: "Pain"


Associated Symptoms: Constipation.  denies: Fever, Chills, Nausea, Vomiting


Exacerbating Factors: None


Alleviating Factors: None


Last Bowel Movement: Days Ago (2)


Recent travel outside of the [place]: No


Additional History Per: Patient


Abnormal Vaginal Bleeding: No





Past Medical History


Reviewed: Historical Data, Nursing Documentation, Vital Signs


Vital Signs: 


 Last Vital Signs











Temp  99.4 F   09/20/17 05:28


 


Pulse  71   09/20/17 05:28


 


Resp  20   09/20/17 05:28


 


BP  122/66   09/20/17 05:28


 


Pulse Ox  97   09/20/17 06:26














- Medical History


PMH: Anemia, Asthma, Atrial Fibrillation, CAD, Diabetes, HTN, Hypothyroidism, 

End Stage Renal Disease, Chronic Kidney Disease


   Denies: Kidney Stones


Surgical History: Coronary Stent





- [facility] Procedures








ASPIRATION SKIN & SUBQ (05/24/14)


EXCISION OF RIGHT LOBE LIVER, PERCUTANEOUS APPROACH, DIAGN (05/02/16)


FLUOROSCOPY OF GALLBLADDER & BILE DUCT USING L OSM CONTRAST (05/02/16)


HEMODIALYSIS (03/20/15)


PERFORMANCE OF URINARY FILTRATION, MULTIPLE (06/26/17)


PERFORMANCE OF URINARY FILTRATION, SINGLE (10/03/16)


RESECTION OF GALLBLADDER, PERCUTANEOUS ENDOSCOPIC APPROACH (05/02/16)








Family History: States: Unknown Family Hx





- Social History


Hx Tobacco Use: No


Hx Alcohol Use: No


Hx Substance Use: No





- Immunization History


Hx Tetanus Toxoid Vaccination: No


Hx Influenza Vaccination: Yes


Hx Pneumococcal Vaccination: Yes





Review Of Systems


Constitutional: Negative for: Fever, Chills


Cardiovascular: Negative for: Chest Pain, Palpitations


Respiratory: Negative for: Cough, Shortness of Breath


Gastrointestinal: Positive for: Abdominal Pain, Constipation.  Negative for: 

Nausea, Vomiting, Diarrhea


Genitourinary: Negative for: Dysuria, Frequency, Hematuria


Musculoskeletal: Negative for: Back Pain


Skin: Negative for: Rash, Lesions, Jaundice, Bruising


Neurological: Negative for: Weakness, Numbness





Physical Exam





- Physical Exam


Appears: Non-toxic, No Acute Distress


Skin: Warm, Dry


Head: Normacephalic


Eye(s): bilateral: Normal Inspection


Oral Mucosa: Moist


Neck: Supple


Chest: Symmetrical, No Deformity, No Tenderness


Cardiovascular: Rhythm Regular, No Murmur


Respiratory: No Rales, Rhonchi (scattered ), No Wheezing, Other (speaking in 

complete sentences )


Gastrointestinal/Abdominal: Soft, No Tenderness, Distention, No Guarding, No 

Rebound, Other (tympanic to percussion )


Extremity: Pedal Edema (trace), Capillary Refill (less than 2 seconds ), Other (

right upper extremity: Cast in place. Dialysis graft with good thrill and bruit 

)


Neurological/Psych: Oriented x3


Gait: Steady





ED Course And Treatment





- Laboratory Results


Result Diagrams: 


 09/20/17 06:26





ECG: Interpreted By Me, Viewed By Me


ECG Rhythm: Sinus Rhythm (71), Nonspecific Changes (pvc's)


O2 Sat by Pulse Oximetry: 97 (on RA)


Pulse Ox Interpretation: Normal





- Radiology


CXR: Interpreted by Me, Viewed By Me


CXR Interpretation: No: Infiltrates, Fracture, Pnemothorax





- Other Rad


  ** obst


X-Ray: Interpreted by Me, Viewed By Me


Interpretation: lots of stool no obstr


Progress Note: labs, EKG, obstructive series abdomen ordered and reviewed. 

Patient received Protonix IVP.





Disposition


Counseled Patient/Family Regarding: Studies Performed, Diagnosis





- Disposition


Disposition Time: 05:24


Condition: FAIR


Forms:  [facility] (English)





- Clinical Impression


Clinical Impression: 


 Abdominal pain, Constipation, ESRD needing dialysis








- Scribe Statement


The provider has reviewed the documentation as recorded by the Scribe (Sravani Levin)


Provider Attestation: 








All medical record entries made by the Scribe were at my direction and 

personally dictated by me. I have reviewed the chart and agree that the record 

accurately reflects my personal performance of the history, physical exam, 

medical decision making, and the department course for this patient. I have 

also personally directed, reviewed, and agree with the discharge instructions 

and disposition.





Physician Patient Turnover


Patient Signed Over To: Alfreda Hooper


Handoff Comments: pending labs, and dispostion
tr[facility] all pertinent systems normal

## 2019-10-20 NOTE — RAD
HISTORY:

new onset afib  



COMPARISON:

06/14/2017 



FINDINGS:



LUNGS:

Mild venous congestion.



Patchy left basilar airspace opacity with question trace left pleural 

effusion. 



Bilateral hilar prominence. 



PLEURA:

No significant pleural effusion identified, no pneumothorax apparent.



CARDIOVASCULAR:

Mild cardiomegaly.



OSSEOUS STRUCTURES:

No significant abnormalities.



VISUALIZED UPPER ABDOMEN:

Normal.



OTHER FINDINGS:

Right axillary stent in place.



IMPRESSION:





Mild venous congestion.



Patchy left basilar airspace opacity with question trace left pleural 

effusion. 



Bilateral hilar prominence. Yes

## 2021-05-31 NOTE — C.PDOC
Problem: At Risk for Falls  Goal: # Patient does not fall  Outcome: Outcome Met, Continue evaluating goal progress toward completion  Goal: # Takes action to control fall-related risks  Outcome: Outcome Met, Continue evaluating goal progress toward completion     Problem: VTE, Risk for  Goal: # No s/s of VTE  Outcome: Outcome Met, Continue evaluating goal progress toward completion     Problem: Breathing Pattern Ineffective  Goal: Air exchange is effective, demonstrated by Sp02 sat of greater then or = 92% (or as ordered)  Outcome: Outcome Met, Continue evaluating goal progress toward completion     Problem: Pressure Injury, Risk for  Goal: No new pressure injury (PI) development  Outcome: Outcome Met, Continue evaluating goal progress toward completion     Problem: Pressure Injury, Risk for  Goal: # Skin remains intact  Outcome: Outcome Not Met, Plan Adjusted      History Of Present Illness


77-year-old female, PMHx includes Anemia, Hypertension, CAD, Asthma, 

Hypothyroidism, Diabetes, and ESRD,  brought to the emergency department for 

evaluation of light headedness during her dialysis (dialysis was completed).  

Patient's blood sugar was checked in the field - 83.  She was given packets of 

glucose, with improvement of symptoms.  She denies chest pain, shortness of 

breath, cough, fever, nausea, vomiting, diarrhea.  Patient takes Novolog and 

Lantus for her DM.





Nephrology-   Guevara Garcia MD.


Time Seen by Provider: 05/15/17 10:12


Chief Complaint (Nursing): Dizziness/Lightheaded


History Per: Patient


History/Exam Limitations: no limitations


Onset/Duration Of Symptoms: Days


Severity: Mild





Past Medical History


Reviewed: Historical Data, Nursing Documentation, Vital Signs


Vital Signs: 


 Last Vital Signs











Temp  97.6 F   05/15/17 11:00


 


Pulse  75   05/15/17 12:37


 


Resp  16   05/15/17 12:37


 


BP  149/85   05/15/17 12:37


 


Pulse Ox  98   05/20/17 17:39














- Medical History


PMH: Anemia, Asthma, CAD, Diabetes, HTN, Hypothyroidism, End Stage Renal 

Disease (3 x a week  m-w-f), Chronic Kidney Disease


Surgical History: Coronary Stent





- [facility] Procedures








ASPIRATION SKIN & SUBQ (05/24/14)


EXCISION OF RIGHT LOBE LIVER, PERCUTANEOUS APPROACH, DIAGN (05/02/16)


FLUOROSCOPY OF GALLBLADDER & BILE DUCT USING L OSM CONTRAST (05/02/16)


HEMODIALYSIS (03/20/15)


PERFORMANCE OF URINARY FILTRATION, SINGLE (10/03/16)


RESECTION OF GALLBLADDER, PERCUTANEOUS ENDOSCOPIC APPROACH (05/02/16)








Family History: States: No Known Family Hx





- Social History


Hx Tobacco Use: No


Hx Alcohol Use: No


Hx Substance Use: No





- Immunization History


Hx Tetanus Toxoid Vaccination: No


Hx Influenza Vaccination: No


Hx Pneumococcal Vaccination: No





Review Of Systems


Except As Marked, All Systems Reviewed And Found Negative.


Constitutional: Negative for: Fever


Cardiovascular: Positive for: Light Headedness.  Negative for: Chest Pain, 

Palpitations


Respiratory: Negative for: Cough, Shortness of Breath


Gastrointestinal: Negative for: Nausea, Vomiting, Abdominal Pain, Diarrhea


Musculoskeletal: Negative for: Back Pain


Skin: Negative for: Rash


Neurological: Negative for: Headache, Dizziness





Physical Exam





- Physical Exam


Appears: Well, Non-toxic, No Acute Distress


Skin: Warm, Dry, No Rash


Head: Normacephalic


Eye(s): bilateral: Normal Inspection, PERRL, EOMI


Oral Mucosa: Moist


Neck: Normal, Normal ROM


Cardiovascular: Rhythm Regular


Respiratory: Normal Breath Sounds, No Rales, No Rhonchi, No Wheezing


Gastrointestinal/Abdominal: Normal Exam, Bowel Sounds, Soft, No Tenderness


Extremity: Normal ROM, Other (AV FISTULA IN RIGHT UPPER EXT WITH PALPABLE THRILL

)


Pulses: Left Dorsalis Pedis: Normal, Right Dorsalis Pedis: Normal


Neurological/Psych: Oriented x3





ED Course And Treatment





- Laboratory Results


Result Diagrams: 


 05/15/17 10:44





 05/15/17 10:44


ECG: Interpreted By Me, Viewed By Me (sinus rhythm 66 bpm, PVCs, left axis 

deviation, no acute ST/T wave changes)


O2 Sat by Pulse Oximetry: 98 (RA)


Pulse Ox Interpretation: Normal





- Radiology


CXR: Interpreted by Me, Viewed By Me (no infiltrates/effusions)


Progress Note: Bloodwork, EKG, Chest X-Ray ordered and reviewed. Patient 

requesting something to eat - fed in ED.


Reevaluation Time: 12:15


Reassessment Condition: Improved (Patient reassessed, is currently resting 

comfortably, in no distress/pain.  Patient states she feels much better and 

would like to be discharged home.  Vitals are WNL, patient is well appearing 

and ambulating normally in ED.  She was discharged home, instructed to follow 

up with PMD in 1-2 days.  She understands she should return to ED if symptoms 

return/worsen.)





Disposition


Counseled Patient/Family Regarding: Studies Performed, Diagnosis, Need For 

Followup





- Disposition


Referrals: 


Guevara Garcia MD [Staff Provider] - 


Disposition: HOME/ ROUTINE


Disposition Time: 12:15


Condition: STABLE


Additional Instructions: 


FOLLOW UP WITH YOUR DOCTOR IN 1-2 DAYS





EAT REGULAR MEALS





RETURN TO ER IF YOU HAVE ANY CONCERNING SYMPTOMS





Instructions:  Lightheadedness (ED)


Print Language: ENGLISH





- POA


Present On Arrival: None





- Clinical Impression


Clinical Impression: 


 Lightheadedness








- Scribe Statement


The provider has reviewed the documentation as recorded by the Scribjennifer Perrin





All medical record entries made by the Scribe were at my direction and 

personally dictated by me. I have reviewed the chart and agree that the record 

accurately reflects my personal performance of the history, physical exam, 

medical decision making, and the department course for this patient. I have 

also personally directed, reviewed, and agree with the discharge instructions 

and disposition.

## 2025-02-20 NOTE — RAD
PROCEDURE:  Right Wrist Radiographs.







HISTORY:

pain, s/p fall



COMPARISON:

None.



FINDINGS:



BONES:

Cortical lucency is questioned parallel to the plane of the the shaft 

of the distal right radius at the level of the epiphysis and 

metaphysis, potentially comminuted.  Follow-up CT or MRI is advised 

for better characterization.  A present this is likely an articular 

fracture. Degenerative cysts are identified at the trapezium and base 

of the 1st metacarpal bone which are likely degenerative.



JOINTS:

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 

degenerate change identified at the carpal carpal and carpal 

metacarpal articulations diffusely. The navicular bone appears intact.



SOFT TISSUES:

 calcification is seen at the renals ulnar side of the 

distal forearm and wrist soft tissues. 



OTHER FINDINGS:

None.



IMPRESSION:

There is a likely nondisplaced articular fracture of the distal 

radius which is likely affecting the radiocarpal joint. No 

dislocation.  Follow-up CT is advised for confirmation or MRI. Detail Level: Generalized Detail Level: Detailed Detail Level: Simple